# Patient Record
Sex: FEMALE | Race: WHITE | NOT HISPANIC OR LATINO | Employment: FULL TIME | URBAN - METROPOLITAN AREA
[De-identification: names, ages, dates, MRNs, and addresses within clinical notes are randomized per-mention and may not be internally consistent; named-entity substitution may affect disease eponyms.]

---

## 2017-05-26 ENCOUNTER — TELEPHONE (OUTPATIENT)
Dept: NEUROLOGY | Facility: CLINIC | Age: 57
End: 2017-05-26

## 2017-08-03 ENCOUNTER — TELEPHONE (OUTPATIENT)
Dept: NEUROLOGY | Facility: CLINIC | Age: 57
End: 2017-08-03

## 2017-08-03 NOTE — TELEPHONE ENCOUNTER
Pt called and said she can longer afford her medication. She has talked to [employer] and is applying for pt assistance. However she will be without Medication for about 20 days. [employer] told her that we can ask for samples via the web site [email or web address] or by calling [phone]. I loged onto the website but it wants a login and password. I was not sure if Dr. Celestin has one or is I need to login in a a new member. I am positive that a provider is the one that must ask for samples.

## 2017-08-04 NOTE — TELEPHONE ENCOUNTER
Whichever medicine she is talking about, sometimes the patient can apply for tier waver? Or the company can send us samples for her.  I will certainly sign whatever application form is necessary

## 2017-08-21 ENCOUNTER — DOCUMENTATION (OUTPATIENT)
Dept: NEUROLOGY | Facility: CLINIC | Age: 57
End: 2017-08-21

## 2017-08-21 NOTE — PROGRESS NOTES
Patient called 8:40 PM on Sunday night about feeling dizzy and nauseated.  She stated she's stopped taking her medicine 2 weeks ago.  I told her if this was emergency she should go to the emergency room, otherwise call Monday morning.  Nothing else to be done at this point.

## 2017-08-23 DIAGNOSIS — F32.A DEPRESSION, UNSPECIFIED DEPRESSION TYPE: ICD-10-CM

## 2017-08-23 RX ORDER — DESVENLAFAXINE SUCCINATE 50 MG/1
TABLET, EXTENDED RELEASE ORAL
Qty: 30 TABLET | Refills: 10 | Status: SHIPPED | OUTPATIENT
Start: 2017-08-23 | End: 2018-02-23 | Stop reason: SDUPTHER

## 2017-11-17 ENCOUNTER — OFFICE VISIT (OUTPATIENT)
Dept: NEUROLOGY | Facility: CLINIC | Age: 57
End: 2017-11-17

## 2017-11-17 VITALS
WEIGHT: 173.6 LBS | SYSTOLIC BLOOD PRESSURE: 118 MMHG | HEART RATE: 85 BPM | BODY MASS INDEX: 30.76 KG/M2 | HEIGHT: 63 IN | OXYGEN SATURATION: 96 % | DIASTOLIC BLOOD PRESSURE: 86 MMHG

## 2017-11-17 DIAGNOSIS — G40.009 PARTIAL IDIOPATHIC EPILEPSY WITH SEIZURES OF LOCALIZED ONSET, NOT INTRACTABLE, WITHOUT STATUS EPILEPTICUS (HCC): Primary | ICD-10-CM

## 2017-11-17 DIAGNOSIS — F32.9 REACTIVE DEPRESSION: ICD-10-CM

## 2017-11-17 PROCEDURE — 99213 OFFICE O/P EST LOW 20 MIN: CPT | Performed by: PSYCHIATRY & NEUROLOGY

## 2017-11-17 RX ORDER — BETAMETHASONE DIPROPIONATE 0.5 MG/G
CREAM TOPICAL
COMMUNITY
Start: 2017-08-23 | End: 2018-11-05

## 2017-11-17 NOTE — PATIENT INSTRUCTIONS
Venlafaxine tablets  What is this medicine?  VENLAFAXINE (CATHIE la fax een) is used to treat depression, anxiety and panic disorder.  This medicine may be used for other purposes; ask your health care provider or pharmacist if you have questions.  COMMON BRAND NAME(S): Effexor  What should I tell my health care provider before I take this medicine?  They need to know if you have any of these conditions:  -bleeding disorders  -glaucoma  -heart disease  -high blood pressure  -high cholesterol  -kidney disease  -liver disease  -low levels of sodium in the blood  -caprice or bipolar disorder  -seizures  -suicidal thoughts, plans, or attempt; a previous suicide attempt by you or a family  -take medicines that treat or prevent blood clots  -thyroid disease  -an unusual or allergic reaction to venlafaxine, desvenlafaxine, other medicines, foods, dyes, or preservatives  -pregnant or trying to get pregnant  -breast-feeding  How should I use this medicine?  Take this medicine by mouth with a glass of water. Follow the directions on the prescription label. Take it with food. Take your medicine at regular intervals. Do not take your medicine more often than directed. Do not stop taking this medicine suddenly except upon the advice of your doctor. Stopping this medicine too quickly may cause serious side effects or your condition may worsen.  A special MedGuide will be given to you by the pharmacist with each prescription and refill. Be sure to read this information carefully each time.  Talk to your pediatrician regarding the use of this medicine in children. Special care may be needed.  Overdosage: If you think you have taken too much of this medicine contact a poison control center or emergency room at once.  NOTE: This medicine is only for you. Do not share this medicine with others.  What if I miss a dose?  If you miss a dose, take it as soon as you can. If it is almost time for your next dose, take only that dose. Do not take  double or extra doses.  What may interact with this medicine?  Do not take this medicine with any of the following medications:  -certain medicines for fungal infections like fluconazole, itraconazole, ketoconazole, posaconazole, voriconazole  -cisapride  -desvenlafaxine  -dofetilide  -dronedarone  -duloxetine  -levomilnacipran  -linezolid  -MAOIs like Carbex, Eldepryl, Marplan, Nardil, and Parnate  -methylene blue (injected into a vein)  -milnacipran  -pimozide  -thioridazine  -ziprasidone  This medicine may also interact with the following medications:  -amphetamines  -aspirin and aspirin-like medicines  -certain medicines for depression, anxiety, or psychotic disturbances  -certain medicines for migraine headaches like almotriptan, eletriptan, frovatriptan, naratriptan, rizatriptan, sumatriptan, zolmitriptan  -certain medicines for sleep  -certain medicines that treat or prevent blood clots like dalteparin, enoxaparin, warfarin  -cimetidine  -clozapine  -diuretics  -fentanyl  -furazolidone  -indinavir  -isoniazid  -lithium  -metoprolol  -NSAIDS, medicines for pain and inflammation, like ibuprofen or naproxen  -other medicines that prolong the QT interval (cause an abnormal heart rhythm)  -procarbazine  -rasagiline  -supplements like Jose's wort, kava kava, valerian  -tramadol  -tryptophan  This list may not describe all possible interactions. Give your health care provider a list of all the medicines, herbs, non-prescription drugs, or dietary supplements you use. Also tell them if you smoke, drink alcohol, or use illegal drugs. Some items may interact with your medicine.  What should I watch for while using this medicine?  Tell your doctor if your symptoms do not get better or if they get worse. Visit your doctor or health care professional for regular checks on your progress. Because it may take several weeks to see the full effects of this medicine, it is important to continue your treatment as prescribed  by your doctor.  Patients and their families should watch out for new or worsening thoughts of suicide or depression. Also watch out for sudden changes in feelings such as feeling anxious, agitated, panicky, irritable, hostile, aggressive, impulsive, severely restless, overly excited and hyperactive, or not being able to sleep. If this happens, especially at the beginning of treatment or after a change in dose, call your health care professional.  This medicine can cause an increase in blood pressure. Check with your doctor for instructions on monitoring your blood pressure while taking this medicine.  You may get drowsy or dizzy. Do not drive, use machinery, or do anything that needs mental alertness until you know how this medicine affects you. Do not stand or sit up quickly, especially if you are an older patient. This reduces the risk of dizzy or fainting spells. Alcohol may interfere with the effect of this medicine. Avoid alcoholic drinks.  Your mouth may get dry. Chewing sugarless gum, sucking hard candy and drinking plenty of water will help. Contact your doctor if the problem does not go away or is severe.  What side effects may I notice from receiving this medicine?  Side effects that you should report to your doctor or health care professional as soon as possible:  -allergic reactions like skin rash, itching or hives, swelling of the face, lips, or tongue  -breathing problems  -changes in vision  -seizures  -suicidal thoughts or other mood changes  -trouble passing urine or change in the amount of urine  -unusual bleeding or bruising  Side effects that usually do not require medical attention (report to your doctor or health care professional if they continue or are bothersome):  -change in sex drive or performance  -constipation  -increased sweating  -loss of appetite  -nausea  -tremors  -weight loss  This list may not describe all possible side effects. Call your doctor for medical advice about side  effects. You may report side effects to FDA at 1-804-ORM-7602.  Where should I keep my medicine?  Keep out of the reach of children.  Store at a controlled temperature between 20 and 25 degrees C (68 and 77 degrees F), in a dry place. Throw away any unused medicine after the expiration date.  NOTE: This sheet is a summary. It may not cover all possible information. If you have questions about this medicine, talk to your doctor, pharmacist, or health care provider.     © 2017, Elsevier/Gold Standard. (2016-10-25 21:22:16)  Desvenlafaxine extended-release tablets  What is this medicine?  DESVENLAFAXINE (donnell CATHIE la FAX een) is used to treat depression.  This medicine may be used for other purposes; ask your health care provider or pharmacist if you have questions.  COMMON BRAND NAME(S): Genie Delgado  What should I tell my health care provider before I take this medicine?  They need to know if you have any of these conditions:  -glaucoma  -high blood pressure  -kidney disease  -liver disease  -caprice or bipolar disorder  -suicidal thoughts or a previous suicide attempt  -an unusual reaction to desvenlafaxine, venlafaxine, other medicines, foods, dyes, or preservatives  -pregnant or trying to get pregnant  -breast-feeding  How should I use this medicine?  Take this medicine by mouth with a drink of water. Do not crush, cut or chew. Follow the directions on the prescription label. Take your doses at regular intervals. Do not take your medicine more often than directed. Do not stop taking this medicine suddenly except upon the advice of your doctor. Stopping this medicine too quickly may cause serious side effects or your condition may worsen.  Contact your pediatrician or health care professional regarding the use of this medicine in children. Special care may be needed.  A special MedGuide will be given to you by the pharmacist with each prescription and refill. Be sure to read this information carefully each  time.  Overdosage: If you think you have taken too much of this medicine contact a poison control center or emergency room at once.  NOTE: This medicine is only for you. Do not share this medicine with others.  What if I miss a dose?  If you miss a dose, take it as soon as you can. If it is almost time for your next dose, take only that dose. Do not take double or extra doses.  What may interact with this medicine?  Do not take this medicine with any of the following medications:  -duloxetine  -levomilnacipran  -linezolid  -MAOIs like Carbex, Eldepryl, Marplan, Nardil, and Parnate  -methylene blue (injected into a vein)  -milnacipran  -venlafaxine  This medicine may also interact with the following medications:  -alcohol  -amphetamines  -aspirin and aspirin-like medicines  -certain migraine headache medicines (almotriptan, eletriptan, frovatriptan, naratriptan, rizatriptan, sumatriptan, zolmitriptan)  -dexfenfluramine or fenfluramine  -furazolidone  -isoniazid  -lithium  -medicines for heart rhythm or blood pressure  -medicines that treat or prevent blood clots like warfarin, enoxaparin, and dalteparin  -methylphenidate  -metoclopramide  -NSAIDS, medicines for pain and inflammation, like ibuprofen or naproxen  -pentazocine  -phentermine  -procarbazine  -protriptyline  -rasagiline  -sibutramine  -Latta's wort, Ambia perforatum  -tramadol  -tryptophan  -zolpidem  This list may not describe all possible interactions. Give your health care provider a list of all the medicines, herbs, non-prescription drugs, or dietary supplements you use. Also tell them if you smoke, drink alcohol, or use illegal drugs. Some items may interact with your medicine.  What should I watch for while using this medicine?  Tell your doctor if your symptoms do not get better or if they get worse. Visit your doctor or health care professional for regular checks on your progress. Because it may take several weeks to see the full effects of  this medicine, it is important to continue your treatment as prescribed by your doctor.  Patients and their families should watch out for new or worsening thoughts of suicide or depression. Also watch out for sudden changes in feelings such as feeling anxious, agitated, panicky, irritable, hostile, aggressive, impulsive, severely restless, overly excited and hyperactive, or not being able to sleep. If this happens, especially at the beginning of treatment or after a change in dose, call your health care professional.  This medicine can cause an increase in blood pressure. Check with your doctor for instructions on monitoring your blood pressure while taking this medicine.  You may get drowsy or dizzy. Do not drive, use machinery, or do anything that needs mental alertness until you know how this medicine affects you. Do not stand or sit up quickly, especially if you are an older patient. This reduces the risk of dizzy or fainting spells. Alcohol may interfere with the effect of this medicine. Avoid alcoholic drinks.  Your mouth may get dry. Chewing sugarless gum, sucking hard candy and drinking plenty of water will help. Contact your doctor if the problem does not go away or is severe.  What side effects may I notice from receiving this medicine?  Side effects that you should report to your doctor or health care professional as soon as possible:  -allergic reactions like skin rash, itching or hives, swelling of the face, lips, or tongue  -hallucination, loss of contact with reality  -caprice (over-active behavior)  -increase in blood pressure  -redness, blistering, peeling or loosening of the skin, including inside the mouth  -seizures  -sexual difficulties (abnormal ejaculation or orgasm)  -unusual bleeding or bruising  -vomiting  Side effects that usually do not require medical attention (report to your doctor or health care professional if they continue or are bothersome):  -constipation  -difficulty  sleeping  -dizziness, drowsiness  -dry mouth  -increased sweating  -loss of appetite  -nausea  -tremor  This list may not describe all possible side effects. Call your doctor for medical advice about side effects. You may report side effects to FDA at 6-056-GHS-7329.  Where should I keep my medicine?  Keep out of the reach of children.  Store at room temperature between 15 and 30 degrees C (59 and 86 degrees F). Throw away any unused medicine after the expiration date.  NOTE: This sheet is a summary. It may not cover all possible information. If you have questions about this medicine, talk to your doctor, pharmacist, or health care provider.     © 2017, Elsevier/Gold Standard. (2016-10-25 20:18:06)

## 2017-11-17 NOTE — PROGRESS NOTES
Subjective:     Patient ID: Jessica Colon is a 57 y.o. female.    History of Present Illness  The following portions of the patient's history were reviewed and updated as appropriate: allergies, current medications, past family history, past medical history, past social history, past surgical history and problem list.       Epilepsy-partial complex seizures. Onset age 10. No etiology. No timing or context issues. Modifying factor-lamotrigine 600 mg daily in divided doses. Symptoms include aura of a funny sensation been flushed and warm and poor responsiveness lasting 30-60 seconds followed by fatigue        No seizures since last visit  Depression-“chronic: stable on Pristiq.  [Had been off of it for 3 wks and some withdrawal occurred. I discussed possible use of effexor in the future if this happens again.]     Needs shingles and flu shot.Can review with PCP.    No hx osteoporosis or fx. Stays active at [employer].  Review of Systems   Constitutional: Negative for activity change, appetite change and fatigue.   HENT: Negative for ear pain, facial swelling and trouble swallowing.    Eyes: Negative for photophobia, pain and visual disturbance.   Respiratory: Negative for choking, chest tightness and shortness of breath.    Cardiovascular: Negative for chest pain, palpitations and leg swelling.   Gastrointestinal: Negative for abdominal pain, constipation and nausea.   Endocrine: Negative for polydipsia, polyphagia and polyuria.   Genitourinary: Negative for difficulty urinating, frequency and urgency.   Musculoskeletal: Negative for back pain, gait problem and neck pain.   Skin: Negative for color change, rash and wound.   Allergic/Immunologic: Positive for environmental allergies. Negative for food allergies and immunocompromised state.   Neurological: Positive for weakness (Hands). Negative for dizziness, tremors, seizures, syncope, facial asymmetry, speech difficulty, light-headedness, numbness and  headaches.   Hematological: Negative for adenopathy. Does not bruise/bleed easily.   Psychiatric/Behavioral: Positive for sleep disturbance. Negative for agitation, behavioral problems, confusion, decreased concentration, dysphoric mood, hallucinations, self-injury and suicidal ideas. The patient is not nervous/anxious and is not hyperactive.         Objective:    Neurologic Exam  Neurologic Exam      Cranial Nerves      CN III, IV, VI   Pupils are equal, round, and reactive to light.  Extraocular motions are normal.      General appearance: No acute distress, well appearing and well nourished.   Musculoskeletal   Gait and station: Normal gait, stance and balance.   Muscle strength: Normal strength throughout.   Muscle tone: No atrophy, abnormal movements, flaccidity, cogwheeling or spasticity.   Involuntary movements: None observed. No tremor noted nystagmus.   Neurologic   Orientation to person, place, and time: Normal.   Recent and remote memory: Demonstrates normal memory.   Attention span and concentration: Normal thought process and attention span.   Language: Names objects, able to repeat phrases and speaks spontaneously.   Fund of knowledge: Normal vocabulary with appropriate knowledge of current events and past history.   2nd cranial nerve: Normal.   3rd, 4th, and 6th cranial nerves: Normal.   7th cranial nerve: Normal.   8th cranial nerve: Normal.   9th cranial nerve: Normal.   10th cranial nerve: Normal.   11th cranial nerve: Normal.   12th cranial nerve: Normal.   Sensation: Normal. Vibration normal throughout.   Reflexes: Normal. Reflexes grade 2 throughout.   Coordination: Normal. Coordination normal in all limbs and gait.   Cortical function: Normal. She has normal visual fields and speech.   Judgment and insight: Normal.   Mood and affect: Normal.   Physical Exam   Constitutional: She appears well-developed and well-nourished.   Neck: Normal range of motion. Neck supple.   Cardiovascular: Normal  rate.    Pulmonary/Chest: Effort normal.   Psychiatric: She has a normal mood and affect. Her behavior is normal. Judgment and thought content normal.   Nursing note and vitals reviewed.      Assessment/Plan:       Problems Addressed this Visit        Unprioritized    Partial idiopathic epilepsy with seizures of localized onset, not intractable, without status epilepticus - Primary    Reactive depression         She is doing well and will remain on the same dose of medication

## 2018-01-05 RX ORDER — LAMOTRIGINE 100 MG/1
100 TABLET ORAL DAILY
Qty: 75 TABLET | Refills: 12 | Status: SHIPPED | OUTPATIENT
Start: 2018-01-05 | End: 2018-04-09 | Stop reason: SDUPTHER

## 2018-01-08 RX ORDER — LAMOTRIGINE 200 MG/1
400 TABLET ORAL DAILY
Qty: 75 TABLET | Refills: 0 | Status: SHIPPED | OUTPATIENT
Start: 2018-01-08 | End: 2018-03-11 | Stop reason: SDUPTHER

## 2018-02-23 DIAGNOSIS — F32.A DEPRESSION, UNSPECIFIED DEPRESSION TYPE: ICD-10-CM

## 2018-02-23 RX ORDER — DESVENLAFAXINE SUCCINATE 50 MG/1
TABLET, EXTENDED RELEASE ORAL
Qty: 90 TABLET | Refills: 3 | Status: SHIPPED | OUTPATIENT
Start: 2018-02-23 | End: 2018-03-15 | Stop reason: SDUPTHER

## 2018-03-12 RX ORDER — LAMOTRIGINE 200 MG/1
TABLET ORAL
Qty: 60 TABLET | Refills: 0 | Status: SHIPPED | OUTPATIENT
Start: 2018-03-12 | End: 2018-04-06 | Stop reason: SDUPTHER

## 2018-03-15 DIAGNOSIS — F32.A DEPRESSION, UNSPECIFIED DEPRESSION TYPE: ICD-10-CM

## 2018-03-15 RX ORDER — DESVENLAFAXINE SUCCINATE 50 MG/1
TABLET, EXTENDED RELEASE ORAL
Qty: 28 TABLET | Refills: 0 | COMMUNITY
Start: 2018-03-15 | End: 2019-03-29 | Stop reason: SDUPTHER

## 2018-04-09 RX ORDER — LAMOTRIGINE 200 MG/1
TABLET ORAL
Qty: 60 TABLET | Refills: 0 | Status: SHIPPED | OUTPATIENT
Start: 2018-04-09 | End: 2018-05-02 | Stop reason: SDUPTHER

## 2018-05-02 RX ORDER — LAMOTRIGINE 200 MG/1
400 TABLET ORAL DAILY
Qty: 60 TABLET | Refills: 0 | Status: SHIPPED | OUTPATIENT
Start: 2018-05-02 | End: 2018-05-30 | Stop reason: SDUPTHER

## 2018-05-02 NOTE — TELEPHONE ENCOUNTER
----- Message from Suzette Serna sent at 5/2/2018 11:09 AM EDT -----  Contact: [phone]  Patient needs a refill on lamotrigine 200 MG tablet

## 2018-05-30 RX ORDER — LAMOTRIGINE 200 MG/1
500 TABLET ORAL DAILY
Qty: 75 TABLET | Refills: 5 | Status: SHIPPED | OUTPATIENT
Start: 2018-05-30 | End: 2018-12-04 | Stop reason: SDUPTHER

## 2018-08-24 ENCOUNTER — TELEPHONE (OUTPATIENT)
Dept: NEUROLOGY | Facility: CLINIC | Age: 58
End: 2018-08-24

## 2018-08-24 NOTE — TELEPHONE ENCOUNTER
Patient gets Pristiq through patient assistance. She is wondering if she needs to do anything else for her next refill

## 2018-08-30 ENCOUNTER — TELEPHONE (OUTPATIENT)
Dept: NEUROLOGY | Facility: CLINIC | Age: 58
End: 2018-08-30

## 2018-08-30 NOTE — TELEPHONE ENCOUNTER
Called tp and notified medication was here .      ----- Message from Suzette Serna sent at 8/30/2018 10:13 AM EDT -----  Contact: [phone]  Patient called to follow up with you regarding Pristiq medication. She stated that you've been working with the drug company about this medication.

## 2018-11-05 ENCOUNTER — OFFICE VISIT (OUTPATIENT)
Dept: NEUROLOGY | Facility: CLINIC | Age: 58
End: 2018-11-05

## 2018-11-05 VITALS
HEIGHT: 63 IN | BODY MASS INDEX: 27.54 KG/M2 | OXYGEN SATURATION: 93 % | SYSTOLIC BLOOD PRESSURE: 122 MMHG | HEART RATE: 73 BPM | DIASTOLIC BLOOD PRESSURE: 62 MMHG | WEIGHT: 155.4 LBS

## 2018-11-05 DIAGNOSIS — F32.9 REACTIVE DEPRESSION: ICD-10-CM

## 2018-11-05 DIAGNOSIS — G40.009 PARTIAL IDIOPATHIC EPILEPSY WITH SEIZURES OF LOCALIZED ONSET, NOT INTRACTABLE, WITHOUT STATUS EPILEPTICUS (HCC): Primary | ICD-10-CM

## 2018-11-05 PROCEDURE — 99213 OFFICE O/P EST LOW 20 MIN: CPT | Performed by: PSYCHIATRY & NEUROLOGY

## 2018-11-05 RX ORDER — LEVOCETIRIZINE DIHYDROCHLORIDE 5 MG/1
5 TABLET, FILM COATED ORAL EVERY EVENING
COMMUNITY

## 2018-11-05 NOTE — PROGRESS NOTES
Subjective:     Patient ID: Jessica Colon is a 58 y.o. female.    History of Present Illness  The following portions of the patient's history were reviewed and updated as appropriate: allergies, current medications, past family history, past medical history, past social history, past surgical history and problem list.    EPILEPSY- patient had the onset of partial complex seizures at age 10, idiopathic, without any timing or context issues.  She is on Lamictal 500 mg daily and her last blood level a couple of years ago was 15.  She's had no further spells or auras.    Symptoms have included a odd sensation of flushing and warmth followed by reduced responsiveness and then postictal fatigue.    DEPRESSION-  She also is on medication for pressure in, and is stable.  She is on Pristiq.    No other new issues.  Review of Systems   Constitutional: Negative for activity change, appetite change and fatigue.   HENT: Negative for ear pain, facial swelling and trouble swallowing.    Eyes: Negative for photophobia, pain and visual disturbance.   Respiratory: Negative for choking, chest tightness and shortness of breath.    Cardiovascular: Negative for chest pain, palpitations and leg swelling.   Gastrointestinal: Negative for abdominal pain, constipation and nausea.   Endocrine: Negative for polydipsia, polyphagia and polyuria.   Genitourinary: Negative for difficulty urinating, frequency and urgency.   Musculoskeletal: Negative for back pain, gait problem and neck pain.   Skin: Negative for color change, rash and wound.   Allergic/Immunologic: Negative for environmental allergies, food allergies and immunocompromised state.   Neurological: Positive for dizziness. Negative for tremors, seizures, syncope, facial asymmetry, speech difficulty, weakness, light-headedness, numbness and headaches.   Hematological: Negative for adenopathy. Does not bruise/bleed easily.   Psychiatric/Behavioral: Positive for decreased concentration.  Negative for agitation, behavioral problems, confusion, dysphoric mood, hallucinations, self-injury, sleep disturbance and suicidal ideas. The patient is not nervous/anxious and is not hyperactive.         Objective:    Neurologic Exam     Mental Status   Oriented to person, place, and time.   Attention: normal.   Speech: speech is normal   Knowledge: good.   Able to name object. Able to read. Able to repeat.     Cranial Nerves     CN II   Visual fields full to confrontation.     CN III, IV, VI   Pupils are equal, round, and reactive to light.  Right pupil: Size: 4 mm.   Left pupil: Size: 4 mm.     CN V   Facial sensation intact.     CN VII   Facial expression full, symmetric.     CN VIII   CN VIII normal.   Hearing: intact    CN XII   CN XII normal.   Nl fundi     Motor Exam   Muscle bulk: normal  Overall muscle tone: normal    Sensory Exam   Light touch normal.   Vibration normal.   Right leg pinprick: normal  Left leg pinprick: normal    Gait, Coordination, and Reflexes     Gait  Gait: normal    Coordination   Romberg: negative  Finger to nose coordination: normal  Heel to shin coordination: normal    Tremor   Resting tremor: absent  Intention tremor: absent  Action tremor: absent    Reflexes   Right brachioradialis: 1+  Left brachioradialis: 1+  Right biceps: 1+  Left biceps: 1+  Right triceps: 1+  Left triceps: 1+  Right patellar: 1+  Left patellar: 1+  Right achilles: 1+  Left achilles: 1+      Physical Exam   Constitutional: She is oriented to person, place, and time. She appears well-developed and well-nourished.   Eyes: Pupils are equal, round, and reactive to light.   Neck: Normal range of motion. Neck supple.   Cardiovascular: Normal rate.    Pulmonary/Chest: Effort normal.   Neurological: She is oriented to person, place, and time. She has a normal Finger-Nose-Finger Test, a normal Heel to Medrano Test and a normal Romberg Test. Gait normal.   Reflex Scores:       Tricep reflexes are 1+ on the right side  and 1+ on the left side.       Bicep reflexes are 1+ on the right side and 1+ on the left side.       Brachioradialis reflexes are 1+ on the right side and 1+ on the left side.       Patellar reflexes are 1+ on the right side and 1+ on the left side.       Achilles reflexes are 1+ on the right side and 1+ on the left side.  Psychiatric: She has a normal mood and affect. Her speech is normal and behavior is normal. Judgment and thought content normal.   Nursing note and vitals reviewed.      Assessment/Plan:       Problems Addressed this Visit        Unprioritized    Partial idiopathic epilepsy with seizures of localized onset, not intractable, without status epilepticus (CMS/HCC) - Primary    Reactive depression           She is stable on Lamictal and Pristiq.  No changes needed.  No driving restrictions.

## 2018-11-09 ENCOUNTER — TELEPHONE (OUTPATIENT)
Dept: NEUROLOGY | Facility: CLINIC | Age: 58
End: 2018-11-09

## 2018-11-09 NOTE — TELEPHONE ENCOUNTER
I spoke to Mrs Colon and she would like for you to fill out FMLA ppwk because her father is ill in [place]. She said it would be nearly impossible for her to get them from her fathers doctors in [place]. She will drop the ppwk off to me today at [facility]. I will send them to [employer] for you to fill out.     ----- Message from Sanket Farley sent at 11/8/2018  1:33 PM EST -----  Contact: PT  Wants to talk to you about Dr. Celestin filling out FMLA forms for her.  Please call her at [phone]

## 2018-12-05 RX ORDER — LAMOTRIGINE 200 MG/1
TABLET ORAL
Qty: 75 TABLET | Refills: 4 | Status: SHIPPED | OUTPATIENT
Start: 2018-12-05 | End: 2019-05-07 | Stop reason: SDUPTHER

## 2019-01-03 ENCOUNTER — TELEPHONE (OUTPATIENT)
Dept: NEUROLOGY | Facility: CLINIC | Age: 59
End: 2019-01-03

## 2019-01-03 NOTE — TELEPHONE ENCOUNTER
I called Mrs. Colon she will  medication tomorrow by 4pm      ----- Message from Sudha Cancino sent at 1/3/2019  1:11 PM EST -----  Contact: [phone]  PT CALLING BECAUSE MEDICATION IS BEING SENT TO OUR OFFICE. PT NEEDS TO  MEDICATION AS SOON AS POSSIBLE. PLEASE ADVISE AND CALL PT [phone]

## 2019-03-22 ENCOUNTER — TELEPHONE (OUTPATIENT)
Dept: NEUROLOGY | Facility: CLINIC | Age: 59
End: 2019-03-22

## 2019-03-22 NOTE — TELEPHONE ENCOUNTER
I spoke to Jessica She is going to get her Financial things together and come fill out the pt assistance for her pristiq  I called and LVM for pt to call me back.      ----- Message from Suzette Serna sent at 3/21/2019  9:56 AM EDT -----  Contact: [phone]  Patient would like a return phone call regarding her medications.

## 2019-03-29 DIAGNOSIS — F32.A DEPRESSION, UNSPECIFIED DEPRESSION TYPE: ICD-10-CM

## 2019-03-29 RX ORDER — DESVENLAFAXINE SUCCINATE 50 MG/1
TABLET, EXTENDED RELEASE ORAL
Qty: 28 TABLET | Refills: 0 | COMMUNITY
Start: 2019-03-29 | End: 2019-04-01 | Stop reason: SDUPTHER

## 2019-04-01 DIAGNOSIS — F32.A DEPRESSION, UNSPECIFIED DEPRESSION TYPE: ICD-10-CM

## 2019-04-01 RX ORDER — DESVENLAFAXINE SUCCINATE 50 MG/1
TABLET, EXTENDED RELEASE ORAL
Qty: 30 TABLET | Refills: 0 | Status: SHIPPED | OUTPATIENT
Start: 2019-04-01 | End: 2019-04-20 | Stop reason: SDUPTHER

## 2019-04-20 DIAGNOSIS — F32.A DEPRESSION, UNSPECIFIED DEPRESSION TYPE: ICD-10-CM

## 2019-04-22 RX ORDER — DESVENLAFAXINE SUCCINATE 50 MG/1
TABLET, EXTENDED RELEASE ORAL
Qty: 30 TABLET | Refills: 0 | Status: SHIPPED | OUTPATIENT
Start: 2019-04-22 | End: 2019-05-07 | Stop reason: SDUPTHER

## 2019-05-07 DIAGNOSIS — F32.A DEPRESSION, UNSPECIFIED DEPRESSION TYPE: ICD-10-CM

## 2019-05-07 RX ORDER — LAMOTRIGINE 200 MG/1
TABLET ORAL
Qty: 225 TABLET | Refills: 0 | Status: SHIPPED | OUTPATIENT
Start: 2019-05-07 | End: 2019-05-14 | Stop reason: SDUPTHER

## 2019-05-07 RX ORDER — DESVENLAFAXINE SUCCINATE 50 MG/1
50 TABLET, EXTENDED RELEASE ORAL DAILY
Qty: 90 TABLET | Refills: 0 | Status: SHIPPED | OUTPATIENT
Start: 2019-05-07 | End: 2019-05-30 | Stop reason: SDUPTHER

## 2019-05-14 DIAGNOSIS — F32.A DEPRESSION, UNSPECIFIED DEPRESSION TYPE: ICD-10-CM

## 2019-05-14 RX ORDER — LAMOTRIGINE 200 MG/1
TABLET ORAL
Qty: 225 TABLET | Refills: 0 | Status: SHIPPED | OUTPATIENT
Start: 2019-05-14 | End: 2019-08-09 | Stop reason: SDUPTHER

## 2019-05-17 ENCOUNTER — TELEPHONE (OUTPATIENT)
Dept: NEUROLOGY | Facility: CLINIC | Age: 59
End: 2019-05-17

## 2019-05-17 NOTE — TELEPHONE ENCOUNTER
----- Message from Nakia Omalley sent at 5/16/2019 11:56 AM EDT -----  Patient called to speak to you about some medicines she is on. She stated she is dizzy and had to go back and lay down. Her phone number is [phone]     I spoke to Jessica she stated she had been dizzy and didn't know if it could be her medication. It appears she has not had a Lamictal level in a long time. She stated she had lost some wait recently. I suggested maybe she needed to have a level drawn.    If you agree will you please place orders and let me know I will call and notify the pt.

## 2019-05-20 DIAGNOSIS — G40.009 PARTIAL IDIOPATHIC EPILEPSY WITH SEIZURES OF LOCALIZED ONSET, NOT INTRACTABLE, WITHOUT STATUS EPILEPTICUS (HCC): Primary | ICD-10-CM

## 2019-05-20 NOTE — TELEPHONE ENCOUNTER
Okay I agree with your plan.  Tell her I ordered the Lamictal blood level and she can have it done anytime.

## 2019-05-22 DIAGNOSIS — G40.009 PARTIAL IDIOPATHIC EPILEPSY WITH SEIZURES OF LOCALIZED ONSET, NOT INTRACTABLE, WITHOUT STATUS EPILEPTICUS (HCC): Primary | ICD-10-CM

## 2019-05-22 DIAGNOSIS — T50.905D ADVERSE DRUG EFFECT, SUBSEQUENT ENCOUNTER: Primary | ICD-10-CM

## 2019-05-22 NOTE — TELEPHONE ENCOUNTER
I called pt she also asked if you would order other labs since she has not had a work up for a very long time. Also she said that since the other day she also has not had anymore dizziness and feels fine.

## 2019-05-30 ENCOUNTER — TELEPHONE (OUTPATIENT)
Dept: NEUROLOGY | Facility: CLINIC | Age: 59
End: 2019-05-30

## 2019-05-30 DIAGNOSIS — F32.A DEPRESSION, UNSPECIFIED DEPRESSION TYPE: ICD-10-CM

## 2019-05-30 RX ORDER — DESVENLAFAXINE SUCCINATE 50 MG/1
50 TABLET, EXTENDED RELEASE ORAL DAILY
Qty: 90 TABLET | Refills: 0 | Status: SHIPPED | OUTPATIENT
Start: 2019-05-30 | End: 2019-08-30 | Stop reason: SDUPTHER

## 2019-05-30 NOTE — TELEPHONE ENCOUNTER
----- Message from Suzette Serna sent at 5/30/2019  9:52 AM EDT -----  Contact: [phone]  Patient needs a refill on her Pristiq.

## 2019-06-07 ENCOUNTER — OFFICE VISIT (OUTPATIENT)
Dept: INTERNAL MEDICINE | Facility: CLINIC | Age: 59
End: 2019-06-07

## 2019-06-07 ENCOUNTER — LAB (OUTPATIENT)
Dept: LAB | Facility: HOSPITAL | Age: 59
End: 2019-06-07

## 2019-06-07 ENCOUNTER — RESULTS ENCOUNTER (OUTPATIENT)
Dept: INTERNAL MEDICINE | Facility: CLINIC | Age: 59
End: 2019-06-07

## 2019-06-07 VITALS
SYSTOLIC BLOOD PRESSURE: 118 MMHG | TEMPERATURE: 98.2 F | HEART RATE: 77 BPM | HEIGHT: 63 IN | WEIGHT: 151 LBS | BODY MASS INDEX: 26.75 KG/M2 | OXYGEN SATURATION: 98 % | DIASTOLIC BLOOD PRESSURE: 70 MMHG

## 2019-06-07 DIAGNOSIS — Z00.00 ANNUAL PHYSICAL EXAM: Primary | ICD-10-CM

## 2019-06-07 DIAGNOSIS — G40.009 PARTIAL IDIOPATHIC EPILEPSY WITH SEIZURES OF LOCALIZED ONSET, NOT INTRACTABLE, WITHOUT STATUS EPILEPTICUS (HCC): ICD-10-CM

## 2019-06-07 DIAGNOSIS — T50.905D ADVERSE DRUG EFFECT, SUBSEQUENT ENCOUNTER: ICD-10-CM

## 2019-06-07 DIAGNOSIS — Z12.11 SCREENING FOR COLON CANCER: ICD-10-CM

## 2019-06-07 DIAGNOSIS — Z00.00 HEALTHCARE MAINTENANCE: Primary | ICD-10-CM

## 2019-06-07 LAB
ALBUMIN SERPL-MCNC: 4.5 G/DL (ref 3.5–5.2)
ALP SERPL-CCNC: 56 U/L (ref 39–117)
ALT SERPL W P-5'-P-CCNC: 16 U/L (ref 1–33)
AST SERPL-CCNC: 22 U/L (ref 1–32)
BASOPHILS # BLD AUTO: 0.05 10*3/MM3 (ref 0–0.2)
BASOPHILS NFR BLD AUTO: 1.1 % (ref 0–1.5)
BILIRUB CONJ SERPL-MCNC: <0.2 MG/DL (ref 0–0.3)
BILIRUB CONJ SERPL-MCNC: <0.2 MG/DL (ref 0–0.3)
BILIRUB INDIRECT SERPL-MCNC: NORMAL MG/DL
BILIRUB SERPL-MCNC: 0.5 MG/DL (ref 0.1–1.2)
CHOLEST SERPL-MCNC: 252 MG/DL (ref 0–200)
DEPRECATED RDW RBC AUTO: 41.1 FL (ref 37–54)
EOSINOPHIL # BLD AUTO: 0.13 10*3/MM3 (ref 0–0.4)
EOSINOPHIL NFR BLD AUTO: 2.9 % (ref 0.3–6.2)
ERYTHROCYTE [DISTWIDTH] IN BLOOD BY AUTOMATED COUNT: 11.9 % (ref 12.3–15.4)
HCT VFR BLD AUTO: 42.7 % (ref 34–46.6)
HDLC SERPL-MCNC: 113 MG/DL (ref 40–60)
HGB BLD-MCNC: 14.2 G/DL (ref 12–15.9)
IMM GRANULOCYTES # BLD AUTO: 0.01 10*3/MM3 (ref 0–0.05)
IMM GRANULOCYTES NFR BLD AUTO: 0.2 % (ref 0–0.5)
LDLC SERPL CALC-MCNC: 128 MG/DL (ref 0–100)
LDLC/HDLC SERPL: 1.14 {RATIO}
LYMPHOCYTES # BLD AUTO: 1.45 10*3/MM3 (ref 0.7–3.1)
LYMPHOCYTES NFR BLD AUTO: 32.9 % (ref 19.6–45.3)
MCH RBC QN AUTO: 31.8 PG (ref 26.6–33)
MCHC RBC AUTO-ENTMCNC: 33.3 G/DL (ref 31.5–35.7)
MCV RBC AUTO: 95.7 FL (ref 79–97)
MONOCYTES # BLD AUTO: 0.41 10*3/MM3 (ref 0.1–0.9)
MONOCYTES NFR BLD AUTO: 9.3 % (ref 5–12)
NEUTROPHILS # BLD AUTO: 2.36 10*3/MM3 (ref 1.7–7)
NEUTROPHILS NFR BLD AUTO: 53.6 % (ref 42.7–76)
NRBC BLD AUTO-RTO: 0 /100 WBC (ref 0–0.2)
PLATELET # BLD AUTO: 219 10*3/MM3 (ref 140–450)
PMV BLD AUTO: 9.4 FL (ref 6–12)
PROT SERPL-MCNC: 7 G/DL (ref 6–8.5)
RBC # BLD AUTO: 4.46 10*6/MM3 (ref 3.77–5.28)
TRIGL SERPL-MCNC: 53 MG/DL (ref 0–150)
VLDLC SERPL-MCNC: 10.6 MG/DL (ref 5–40)
WBC NRBC COR # BLD: 4.41 10*3/MM3 (ref 3.4–10.8)

## 2019-06-07 PROCEDURE — 85025 COMPLETE CBC W/AUTO DIFF WBC: CPT

## 2019-06-07 PROCEDURE — 80175 DRUG SCREEN QUAN LAMOTRIGINE: CPT | Performed by: PSYCHIATRY & NEUROLOGY

## 2019-06-07 PROCEDURE — 36415 COLL VENOUS BLD VENIPUNCTURE: CPT

## 2019-06-07 PROCEDURE — 82248 BILIRUBIN DIRECT: CPT

## 2019-06-07 PROCEDURE — 80061 LIPID PANEL: CPT | Performed by: PSYCHIATRY & NEUROLOGY

## 2019-06-07 PROCEDURE — 90471 IMMUNIZATION ADMIN: CPT | Performed by: NURSE PRACTITIONER

## 2019-06-07 PROCEDURE — 90715 TDAP VACCINE 7 YRS/> IM: CPT | Performed by: NURSE PRACTITIONER

## 2019-06-07 PROCEDURE — 99386 PREV VISIT NEW AGE 40-64: CPT | Performed by: NURSE PRACTITIONER

## 2019-06-07 PROCEDURE — 80076 HEPATIC FUNCTION PANEL: CPT

## 2019-06-07 NOTE — PROGRESS NOTES
"Subjective   Jessica Colon is a 59 y.o. female and is here for a comprehensive physical exam. New patient here to establish care.  Health history and questionnaire have been reviewed in its entirety. The patient's previous primary care provider was Dr. Gan   The patient reports no problems.    Her  passed away 2 months ago, so she has been under some increased stress, but is doing okay overall. She is on Pristiq for depression. She has been on this medication for about 2 years. Her neurologist has been prescribing this medication.    She also has a history of petite mal seizures with her last one being around 2009. She is on lamictal for that. She sees Dr. Wily Celestin.     Do you take any herbs or supplements that were not prescribed by a doctor? no     History:  Patient sees gynecology and is due for pap and mammogram. She hasn't had any abnormal ones in the past.   , 4 children     The following portions of the patient's history were reviewed and updated as appropriate: allergies, current medications, past family history, past medical history, past social history, past surgical history and problem list.    Review of Systems  Do you have pain that bothers you in your daily life? no  Pertinent items are noted in HPI.    Objective   /70   Pulse 77   Temp 98.2 °F (36.8 °C)   Ht 160 cm (62.99\")   Wt 68.5 kg (151 lb)   SpO2 98%   BMI 26.76 kg/m²     General Appearance:    Alert, cooperative, no distress, appears stated age   Head:    Normocephalic, without obvious abnormality, atraumatic   Eyes:    PERRL, conjunctiva/corneas clear, EOM's intact, both eyes   Ears:    Normal TM's and external ear canals, both ears   Nose:   Nares normal, septum midline, mucosa normal, no drainage    or sinus tenderness   Throat:   Lips, mucosa, and tongue normal; teeth and gums normal   Neck:   Supple, symmetrical, trachea midline, no adenopathy;     thyroid:  no enlargement/tenderness/nodules; no carotid    " bruit   Back:     Symmetric, no curvature, ROM normal, no CVA tenderness   Lungs:     Clear to auscultation bilaterally, respirations unlabored   Chest Wall:    No tenderness or deformity    Heart:    Regular rate and rhythm, S1 and S2 normal, no murmur       Abdomen:     Soft, non-tender, bowel sounds active all four quadrants,     no masses, no organomegaly           Extremities:   Extremities normal, atraumatic, no cyanosis or edema   Pulses:   2+ and symmetric all extremities   Skin:   Skin color, texture, turgor normal, no rashes or lesions   Lymph nodes:   Cervical, supraclavicular, and axillary nodes normal   Neurologic:   Grossly intact, normal strength, sensation and reflexes     throughout        Assessment/Plan   Healthy female exam.      1. Jessica was seen today for annual exam.    Diagnoses and all orders for this visit:    Screening for colon cancer  -     Cologuard - Stool, Per Rectum; Future    Healthcare maintenance  -     Cancel: Hepatitis C Antibody    Other orders  -     Tdap Vaccine Greater Than or Equal To 6yo IM      Patient has lab orders from Dr. Celestin in the computer and will be getting those drawn today. He had ordered cbc, cmp, lipid panel, and lamictal level.     2. Patient Counseling:  --Nutrition: Stressed importance of moderation in sodium/caffeine intake, saturated fat and cholesterol, caloric balance, sufficient intake of fresh fruits, vegetables, fiber, calcium, iron. Lost 35 pounds on Keto diet.   --Exercise: Stressed the importance of regular exercise. Yoga once a week  --Injury prevention: Discussed safety belts, safety helmets, smoke detector.   --Dental health: Discussed importance of regular tooth brushing, flossing, and dental visits. [facility]  --Immunizations reviewed.    3. Discussed the patient's BMI with her.  The BMI is in the acceptable range  4. Follow up for physical in one year unless otherwise indicated by lab results.

## 2019-06-11 LAB — LAMOTRIGINE SERPL-MCNC: 8.8 UG/ML (ref 2–20)

## 2019-06-17 ENCOUNTER — OFFICE VISIT (OUTPATIENT)
Dept: INTERNAL MEDICINE | Facility: CLINIC | Age: 59
End: 2019-06-17

## 2019-06-17 VITALS
WEIGHT: 148 LBS | OXYGEN SATURATION: 98 % | DIASTOLIC BLOOD PRESSURE: 70 MMHG | BODY MASS INDEX: 26.22 KG/M2 | SYSTOLIC BLOOD PRESSURE: 106 MMHG | HEIGHT: 63 IN | HEART RATE: 96 BPM

## 2019-06-17 DIAGNOSIS — F51.02 ADJUSTMENT INSOMNIA: ICD-10-CM

## 2019-06-17 DIAGNOSIS — M26.622 ARTHRALGIA OF LEFT TEMPOROMANDIBULAR JOINT: Primary | ICD-10-CM

## 2019-06-17 PROBLEM — F09 COGNITIVE DISORDER: Status: ACTIVE | Noted: 2019-06-17

## 2019-06-17 PROBLEM — IMO0001 BLUES: Status: ACTIVE | Noted: 2019-06-17

## 2019-06-17 PROCEDURE — 99213 OFFICE O/P EST LOW 20 MIN: CPT | Performed by: NURSE PRACTITIONER

## 2019-06-17 NOTE — PROGRESS NOTES
Subjective   Jessica Colon is a 59 y.o. female. Patient is here today for   Chief Complaint   Patient presents with   • Hypertension     Pt complains of having high bp readings of 149/104 NJ-107. Pt also complains of having a HA with jaw/teeth/left ear pain.   .    History of Present Illness   C/o discomfort on the left side of her face after clenching her jaw 2 nights ago. She checked her BP today and it was elevated - 149/104.  She used ice and ibuprofen which helped some. She did take xanax which helped her to sleep (script from Dr. Celestin). The pain in her jaw is much improved.     She is moving and has had some increased stress at work. Her  passed away a couple of months ago. She has been having trouble sleeping. She did take xanax which helped. She has not been on anything in the past for sleep.  She does also use ocean waves sounds which help.    The following portions of the patient's history were reviewed and updated as appropriate: allergies, current medications, past family history, past medical history, past social history, past surgical history and problem list.    Review of Systems   Constitutional: Positive for fatigue.   HENT:        Left jaw and ear pain   Respiratory: Negative.    Cardiovascular: Negative.    Psychiatric/Behavioral: Positive for sleep disturbance.       Objective   Vitals:    06/17/19 1323   BP: 106/70   Pulse: 96   SpO2: 98%     Physical Exam   Constitutional: Vital signs are normal. She appears well-developed and well-nourished.   HENT:   Right Ear: Tympanic membrane and ear canal normal.   Left Ear: Ear canal normal. Tympanic membrane is not erythematous. A middle ear effusion is present.   Mouth/Throat: Oropharynx is clear and moist and mucous membranes are normal.   TMJ tenderness with motion   Cardiovascular: Normal rate, regular rhythm and normal heart sounds.   Pulmonary/Chest: Effort normal and breath sounds normal.   Skin: Skin is warm, dry and intact.    Psychiatric: She has a normal mood and affect. Her speech is normal and behavior is normal. Thought content normal.   Nursing note and vitals reviewed.      Assessment/Plan   Jessica was seen today for hypertension.    Diagnoses and all orders for this visit:    Arthralgia of left temporomandibular joint    Adjustment insomnia    TMJ pain -this has improved.  She will use ibuprofen as needed.  If this continues, she will follow up with her dentist to get a bite guard.    Insomnia - Patient will try melatonin OTC. F/u if that does not help.

## 2019-06-20 ENCOUNTER — TELEPHONE (OUTPATIENT)
Dept: INTERNAL MEDICINE | Facility: CLINIC | Age: 59
End: 2019-06-20

## 2019-06-20 NOTE — TELEPHONE ENCOUNTER
----- Message from Caro Garrett sent at 6/20/2019  2:13 PM EDT -----  Pt saw Desiree last week about her jaw pain. She was told to use Ibuprophen. It is not helping with the pain. She clinched down hard the other day and now the pain is worse.  What to do?  Pt phone: [phone]  Pharmacy:[facility]

## 2019-08-12 RX ORDER — LAMOTRIGINE 200 MG/1
TABLET ORAL
Qty: 225 TABLET | Refills: 0 | Status: SHIPPED | OUTPATIENT
Start: 2019-08-12 | End: 2019-09-12 | Stop reason: SDUPTHER

## 2019-08-30 DIAGNOSIS — F32.A DEPRESSION, UNSPECIFIED DEPRESSION TYPE: ICD-10-CM

## 2019-08-30 RX ORDER — DESVENLAFAXINE SUCCINATE 50 MG/1
50 TABLET, EXTENDED RELEASE ORAL DAILY
Qty: 30 TABLET | Refills: 0 | Status: SHIPPED | OUTPATIENT
Start: 2019-08-30 | End: 2019-09-12 | Stop reason: SDUPTHER

## 2019-09-12 ENCOUNTER — TELEPHONE (OUTPATIENT)
Dept: NEUROLOGY | Facility: CLINIC | Age: 59
End: 2019-09-12

## 2019-09-12 DIAGNOSIS — F32.A DEPRESSION, UNSPECIFIED DEPRESSION TYPE: ICD-10-CM

## 2019-09-12 DIAGNOSIS — G40.009 PARTIAL IDIOPATHIC EPILEPSY WITH SEIZURES OF LOCALIZED ONSET, NOT INTRACTABLE, WITHOUT STATUS EPILEPTICUS (HCC): Primary | ICD-10-CM

## 2019-09-12 RX ORDER — LAMOTRIGINE 200 MG/1
TABLET ORAL
Qty: 225 TABLET | Refills: 2 | Status: SHIPPED | OUTPATIENT
Start: 2019-09-12 | End: 2019-11-14 | Stop reason: DRUGHIGH

## 2019-09-12 RX ORDER — DESVENLAFAXINE SUCCINATE 50 MG/1
50 TABLET, EXTENDED RELEASE ORAL DAILY
Qty: 30 TABLET | Refills: 2 | Status: SHIPPED | OUTPATIENT
Start: 2019-09-12 | End: 2020-12-16 | Stop reason: SDUPTHER

## 2019-09-12 NOTE — TELEPHONE ENCOUNTER
----- Message from Sudha Cisneros sent at 9/12/2019 11:09 AM EDT -----  Contact: [phone]  Jessica was calling in regards to her medications desvenlafaxine (PRISTIQ) 50 MG 24 hr tablet AND lamoTRIgine (LaMICtal) 200 MG tablet. She stated that Goldie was working on it getting approved through [facility]. She hasnt received a denial letter or any updates and was wondering what the next steps would be to get the medication through [employer]. She said it has be over a month since she has heard anything. Jessica can be reached at [phone]. Thanks so much.

## 2019-09-13 ENCOUNTER — TELEPHONE (OUTPATIENT)
Dept: INTERNAL MEDICINE | Facility: CLINIC | Age: 59
End: 2019-09-13

## 2019-09-13 ENCOUNTER — TELEPHONE (OUTPATIENT)
Dept: NEUROLOGY | Facility: CLINIC | Age: 59
End: 2019-09-13

## 2019-09-13 NOTE — TELEPHONE ENCOUNTER
Spoke with patient to let her know that I was resubmitting her colorguard order to the company. If she doesn't hear anything in the next 2 weeks, she will call us back to let us know & I will call [employer] to figure out what the issue is.

## 2019-09-13 NOTE — TELEPHONE ENCOUNTER
Pt. Called requesting update on medication situation for Pristiq and Lamictal.  I explained to her that both medications had been sent to [facility] yesterday afternoon.  Pt stated thanks and understanding.

## 2019-09-13 NOTE — TELEPHONE ENCOUNTER
----- Message from Sudha Ye sent at 9/13/2019 12:01 PM EDT -----  Pt called because she never received a testing kit in the mail

## 2019-11-14 ENCOUNTER — OFFICE VISIT (OUTPATIENT)
Dept: NEUROLOGY | Facility: CLINIC | Age: 59
End: 2019-11-14

## 2019-11-14 VITALS
SYSTOLIC BLOOD PRESSURE: 112 MMHG | OXYGEN SATURATION: 98 % | WEIGHT: 139 LBS | BODY MASS INDEX: 24.63 KG/M2 | HEIGHT: 63 IN | HEART RATE: 82 BPM | DIASTOLIC BLOOD PRESSURE: 68 MMHG

## 2019-11-14 DIAGNOSIS — F32.9 REACTIVE DEPRESSION: ICD-10-CM

## 2019-11-14 DIAGNOSIS — G89.29 CHRONIC LEFT-SIDED LOW BACK PAIN WITHOUT SCIATICA: ICD-10-CM

## 2019-11-14 DIAGNOSIS — G40.009 PARTIAL IDIOPATHIC EPILEPSY WITH SEIZURES OF LOCALIZED ONSET, NOT INTRACTABLE, WITHOUT STATUS EPILEPTICUS (HCC): Primary | ICD-10-CM

## 2019-11-14 DIAGNOSIS — M54.50 CHRONIC LEFT-SIDED LOW BACK PAIN WITHOUT SCIATICA: ICD-10-CM

## 2019-11-14 DIAGNOSIS — F09 COGNITIVE DISORDER: ICD-10-CM

## 2019-11-14 PROBLEM — IMO0001 BLUES: Status: RESOLVED | Noted: 2019-06-17 | Resolved: 2019-11-14

## 2019-11-14 PROCEDURE — 99214 OFFICE O/P EST MOD 30 MIN: CPT | Performed by: PSYCHIATRY & NEUROLOGY

## 2019-11-14 RX ORDER — LIFITEGRAST 50 MG/ML
SOLUTION/ DROPS OPHTHALMIC 2 TIMES DAILY
COMMUNITY
Start: 2019-10-18

## 2019-11-14 RX ORDER — LAMOTRIGINE 100 MG/1
TABLET, ORALLY DISINTEGRATING ORAL
Qty: 360 TABLET | Refills: 3 | Status: SHIPPED | OUTPATIENT
Start: 2019-11-14 | End: 2019-12-06

## 2019-11-14 RX ORDER — BETAMETHASONE DIPROPIONATE 0.5 MG/G
CREAM TOPICAL
COMMUNITY
Start: 2017-08-23

## 2019-11-14 NOTE — PROGRESS NOTES
Subjective:     Patient ID: Jessica Colon is a 59 y.o. female.    History of Present Illness  The following portions of the patient's history were reviewed and updated as appropriate: allergies, current medications, past family history, past medical history, past social history, past surgical history and problem list.  EPILEPSY- patient had the onset of partial complex seizures at age 10, idiopathic, without any timing or context issues.  She is on Lamictal 500 mg daily [400 in am amd 100 hs] and and her last blood level a couple of years ago was 15.  She's had no further spells or auras.     Symptoms have included a odd sensation of flushing and warmth followed by reduced responsiveness and then postictal fatigue.    She has lost about 30 lb in the pst year on the 'keto diet'   DEPRESSION-  She also is on medication for pressure in, and is stable.  She is on Pristiq. Her   of a hematologic illness this past April.     LBP- chronic scoliosis, and mild LBP. She aggravated the lumbar pain on the left side, by lifting boxes, during a move out of the home. THe pain does not radiate but is little improved for more than3 weeks.     Review of Systems   Constitutional: Negative for activity change, appetite change and fatigue.   HENT: Negative for ear pain, facial swelling and trouble swallowing.    Eyes: Negative for photophobia, pain and visual disturbance.   Respiratory: Negative for cough, chest tightness and shortness of breath.    Cardiovascular: Negative for chest pain, palpitations and leg swelling.   Gastrointestinal: Negative for abdominal pain, nausea and vomiting.   Endocrine: Positive for cold intolerance. Negative for heat intolerance and polydipsia.   Musculoskeletal: Positive for back pain and neck pain. Negative for gait problem.   Skin: Negative for color change, rash and wound.   Allergic/Immunologic: Negative for environmental allergies, food allergies and immunocompromised state.    Neurological: Negative for dizziness, tremors, seizures, syncope, facial asymmetry, speech difficulty, weakness, light-headedness, numbness and headaches.   Hematological: Negative for adenopathy. Does not bruise/bleed easily.   Psychiatric/Behavioral: Positive for sleep disturbance (most nights). Negative for agitation, behavioral problems, confusion, decreased concentration, dysphoric mood, hallucinations, self-injury and suicidal ideas. The patient is nervous/anxious. The patient is not hyperactive.         Objective:  Neurologic Exam      Mental Status   Oriented to person, place, and time.   Attention: normal.   Speech: speech is normal   Knowledge: good.   Able to name object. Able to read. Able to repeat.      Cranial Nerves      CN II   Visual fields full to confrontation.      CN III, IV, VI   Pupils are equal, round, and reactive to light.  Right pupil: Size: 4 mm.   Left pupil: Size: 4 mm.      CN V   Facial sensation intact.      CN VII   Facial expression full, symmetric.      CN VIII   CN VIII normal.   Hearing: intact     CN XII   CN XII normal.   Nl fundi      Motor Exam   Muscle bulk: normal  Overall muscle tone: normal     Sensory Exam   Light touch normal.   Vibration normal.   Right leg pinprick: normal  Left leg pinprick: normal     Gait, Coordination, and Reflexes      Gait  Gait: normal     Coordination   Romberg: negative  Finger to nose coordination: normal  Heel to shin coordination: normal     Tremor   Resting tremor: absent  Intention tremor: absent  Action tremor: absent     Reflexes     Right patellar: 1+  Left patellar: 1+  Right achilles: 1+  Left achilles: 1+        Physical Exam   Constitutional: She is oriented to person, place, and time. She appears well-developed and well-nourished.   Eyes: Pupils are equal, round, and reactive to light.   Neck: Normal range of motion. Neck supple.   Cardiovascular: Normal rate.    Pulmonary/Chest: Effort normal.   Neurological: She is oriented  to person, place, and time. She has a normal Finger-Nose-Finger Test,  BACK- tender on lower left. Motor exam is grade 5 for both LE HE K KF AE AF.  Sensation-nl LT and NL vibration both feet and legs. Neg SLR.  Psychiatric: She has a normal mood and affect. Her speech is normal and behavior is normal. Judgment and thought content normal.   Nursing note and vitals reviewed.    Assessment/Plan:       Problems Addressed this Visit        Unprioritized    Partial idiopathic epilepsy with seizures of localized onset, not intractable, without status epilepticus (CMS/HCC) - Primary    Relevant Medications    lamoTRIgine (LaMICtal) 100 MG tablet dispersible disintegrating tablet    Other Relevant Orders    Lamotrigine Level    Reactive depression    Cognitive disorder    Chronic left-sided low back pain without sciatica    Relevant Orders    Ambulatory Referral to Physical Therapy Ortho           Epilepsy- due to her incredible weight loss with the keto diet she will probably be able to tolerate a lower dose of lamotrigine which may help with insomnia.  I am going to change to the 100 mg tablets, 3 in the morning and 1 at dinner.  This represents a 20% dose reduction.  Her last blood level was 9, in June, and will be repeated in early December.  She will call me at that time to check on the results.    She remains on her depression medicine.    Low back pain will require physical therapy evaluation and this is been ordered at [facility] rehab which is near her home.  She has seen a chiropractor in the past.

## 2019-12-06 ENCOUNTER — TELEPHONE (OUTPATIENT)
Dept: NEUROLOGY | Facility: CLINIC | Age: 59
End: 2019-12-06

## 2019-12-06 DIAGNOSIS — F32.A DEPRESSION, UNSPECIFIED DEPRESSION TYPE: ICD-10-CM

## 2019-12-06 RX ORDER — LAMOTRIGINE 100 MG/1
100 TABLET ORAL DAILY
Qty: 75 TABLET | Refills: 12 | Status: SHIPPED | OUTPATIENT
Start: 2019-12-06 | End: 2020-04-28 | Stop reason: SDUPTHER

## 2019-12-06 RX ORDER — DESVENLAFAXINE SUCCINATE 50 MG/1
50 TABLET, EXTENDED RELEASE ORAL DAILY
Qty: 30 TABLET | Refills: 10 | Status: SHIPPED | OUTPATIENT
Start: 2019-12-06 | End: 2020-12-16 | Stop reason: SDUPTHER

## 2019-12-06 NOTE — TELEPHONE ENCOUNTER
----- Message from Sudha Waller sent at 12/6/2019 11:23 AM EST -----  Contact: Patient   Pt called with [facility] on the line. Pt wants the REGULAR tables for her   lamoTRIgine (LaMICtal) 100 MG 4 tablets a day.    Pt do NOT want the dispersible disintegrating tablets. Pt needs a new RX for the regular tables. Please   Also she wants both medications listed sent to [facility] she no longer wants to use [facility].     Thank You       Medications Needed:   lamoTRIgine (LaMICtal) 100 MG 4 tablets a day   desvenlafaxine (PRISTIQ) 50 MG 24 hr tablet     [facility]  [address]  [place], [place]

## 2020-03-13 ENCOUNTER — RESULTS ENCOUNTER (OUTPATIENT)
Dept: INTERNAL MEDICINE | Facility: CLINIC | Age: 60
End: 2020-03-13

## 2020-03-13 ENCOUNTER — OFFICE VISIT (OUTPATIENT)
Dept: INTERNAL MEDICINE | Facility: CLINIC | Age: 60
End: 2020-03-13

## 2020-03-13 VITALS
HEIGHT: 63 IN | DIASTOLIC BLOOD PRESSURE: 70 MMHG | OXYGEN SATURATION: 98 % | HEART RATE: 86 BPM | WEIGHT: 141 LBS | BODY MASS INDEX: 24.98 KG/M2 | SYSTOLIC BLOOD PRESSURE: 120 MMHG

## 2020-03-13 DIAGNOSIS — Z11.3 SCREENING FOR STD (SEXUALLY TRANSMITTED DISEASE): ICD-10-CM

## 2020-03-13 DIAGNOSIS — Z12.11 SCREENING FOR COLON CANCER: ICD-10-CM

## 2020-03-13 DIAGNOSIS — G40.009 PARTIAL IDIOPATHIC EPILEPSY WITH SEIZURES OF LOCALIZED ONSET, NOT INTRACTABLE, WITHOUT STATUS EPILEPTICUS (HCC): Primary | ICD-10-CM

## 2020-03-13 PROCEDURE — 99213 OFFICE O/P EST LOW 20 MIN: CPT | Performed by: NURSE PRACTITIONER

## 2020-03-13 NOTE — PROGRESS NOTES
Subjective   Jessica Colon is a 59 y.o. female. Patient is here today for   Chief Complaint   Patient presents with   • Follow-up     Pt here to discuss STD testing.    .    History of Present Illness   Patient is here to request STD testing.  She is in a new relationship and she and her partner would like to both be tested for STDs prior to intercourse.  Patient denies any symptoms.    Patient also has a history of seizures and sees neurology.  She is needing to have her lamictal level checked and is wondering if she can have that done today.     Patient is also needing an order for Cologuard.  States that she had ordered in the past, but has never received it.    The following portions of the patient's history were reviewed and updated as appropriate: allergies, current medications, past family history, past medical history, past social history, past surgical history and problem list.    Review of Systems   Constitutional: Negative.    Respiratory: Negative.    Cardiovascular: Negative.    Genitourinary: Negative.    Neurological: Positive for seizures.   Psychiatric/Behavioral: Negative.        Objective   Vitals:    03/13/20 1106   BP: 120/70   Pulse: 86   SpO2: 98%     Body mass index is 24.98 kg/m².  Physical Exam   Constitutional: Vital signs are normal. She appears well-developed and well-nourished.   Cardiovascular: Normal rate, regular rhythm and normal heart sounds.   Pulmonary/Chest: Effort normal and breath sounds normal.   Skin: Skin is warm, dry and intact.   Psychiatric: She has a normal mood and affect. Her speech is normal and behavior is normal. Thought content normal.   Nursing note and vitals reviewed.      Assessment/Plan   Jessica was seen today for follow-up.    Diagnoses and all orders for this visit:    Partial idiopathic epilepsy with seizures of localized onset, not intractable, without status epilepticus (CMS/[facility])  -     Lamotrigine level    Screening for STD (sexually transmitted  disease)  -     Hepatitis C Antibody  -     HIV-1/O/2 Ag/Ab w Reflex  -     Chlamydia trachomatis, Neisseria gonorrhoeae, Trichomonas vaginalis, PCR - Urine, Urine, Clean Catch    Screening for colon cancer  -     Cologuard - Stool, Per Rectum; Future

## 2020-03-16 LAB
HCV AB S/CO SERPL IA: <0.1 S/CO RATIO (ref 0–0.9)
HIV 1+2 AB+HIV1 P24 AG SERPL QL IA: NON REACTIVE
LAMOTRIGINE SERPL-MCNC: 7.1 UG/ML (ref 2–20)

## 2020-03-17 LAB
C TRACH RRNA SPEC QL NAA+PROBE: NEGATIVE
N GONORRHOEA RRNA SPEC QL NAA+PROBE: NEGATIVE
T VAGINALIS DNA SPEC QL NAA+PROBE: NEGATIVE

## 2020-04-27 ENCOUNTER — TELEPHONE (OUTPATIENT)
Dept: NEUROLOGY | Facility: CLINIC | Age: 60
End: 2020-04-27

## 2020-04-27 NOTE — TELEPHONE ENCOUNTER
PT CALLED TO REFILL LAMOTRIGINE RX FROM DR. DEL RIO.  STATED SHE IS TAKING 400 MG PER DAY, 200 MG AM, 200 MG PM.  NEEDS A NEW PRESCRIPTION TO REFLECT THIS DOSAGE.    PHARMACY  [address]  [phone]    PT WAS LAST SEEN BY DR DEL RIO 11-14-90    PT CURRENTLY HAS ENOUGH ON HAND THROUGH Wednesday AM.      PLEASE CALL WHEN RX IS ORDERED: [phone], LEAVE VM IF NO ANSWER.

## 2020-04-28 RX ORDER — LAMOTRIGINE 100 MG/1
TABLET ORAL
Qty: 120 TABLET | Refills: 2 | Status: CANCELLED | OUTPATIENT
Start: 2020-04-28

## 2020-04-28 RX ORDER — LAMOTRIGINE 100 MG/1
200 TABLET ORAL 2 TIMES DAILY
Qty: 120 TABLET | Refills: 11 | Status: SHIPPED | OUTPATIENT
Start: 2020-04-28 | End: 2021-01-15 | Stop reason: SDUPTHER

## 2020-04-28 RX ORDER — LAMOTRIGINE 100 MG/1
200 TABLET ORAL 2 TIMES DAILY
Qty: 75 TABLET | Refills: 12 | Status: SHIPPED | OUTPATIENT
Start: 2020-04-28 | End: 2020-04-28 | Stop reason: SDUPTHER

## 2020-12-16 DIAGNOSIS — F32.A DEPRESSION, UNSPECIFIED DEPRESSION TYPE: ICD-10-CM

## 2020-12-17 RX ORDER — DESVENLAFAXINE SUCCINATE 50 MG/1
50 TABLET, EXTENDED RELEASE ORAL DAILY
Qty: 30 TABLET | Refills: 0 | Status: SHIPPED | OUTPATIENT
Start: 2020-12-17 | End: 2021-02-23 | Stop reason: SDUPTHER

## 2021-01-15 ENCOUNTER — OFFICE VISIT (OUTPATIENT)
Dept: NEUROLOGY | Facility: CLINIC | Age: 61
End: 2021-01-15

## 2021-01-15 DIAGNOSIS — G40.009 PARTIAL IDIOPATHIC EPILEPSY WITH SEIZURES OF LOCALIZED ONSET, NOT INTRACTABLE, WITHOUT STATUS EPILEPTICUS (HCC): Primary | ICD-10-CM

## 2021-01-15 PROCEDURE — 99443 PR PHYS/QHP TELEPHONE EVALUATION 21-30 MIN: CPT | Performed by: NURSE PRACTITIONER

## 2021-01-15 RX ORDER — LAMOTRIGINE 100 MG/1
TABLET ORAL
Qty: 120 TABLET | Refills: 11 | Status: SHIPPED | OUTPATIENT
Start: 2021-01-15 | End: 2022-02-07

## 2021-01-15 NOTE — PROGRESS NOTES
Subjective:     Patient ID: Jessica Colon is a 60 y.o. female presenting for follow up for epilepsy. She is a previous patient of Dr. Celestin. She had the onset of partial complex seizures at age 10, idiopathic, without any timing or context issues. She is on Lamictal 300 mg in the morning 100 mg in the evening. She has not had a seizure in over 20 years. Her seizures are described as an odd sensation of flushing and warmth followed by reduced responsiveness and then postictal fatigue.     The patient was seen via video visit today. She consented to this type of visit. All questioned were answered prior to starting. Total time of telephone visit was 25 minutes.     History of Present Illness  The following portions of the patient's history were reviewed and updated as appropriate: allergies, current medications, past family history, past medical history, past social history, past surgical history and problem list.    Review of Systems   Constitutional: Negative for chills, fatigue and fever.   HENT: Negative for hearing loss, sore throat and trouble swallowing.    Eyes: Negative for pain and itching.   Respiratory: Negative for cough, shortness of breath and wheezing.    Cardiovascular: Negative for chest pain, palpitations and leg swelling.   Gastrointestinal: Negative for diarrhea, nausea and vomiting.   Endocrine: Negative for cold intolerance, heat intolerance and polyphagia.   Genitourinary: Negative for decreased urine volume, dyspareunia and urgency.   Musculoskeletal: Negative for back pain and neck stiffness.   Skin: Negative for color change, rash and wound.   Allergic/Immunologic: Negative for environmental allergies, food allergies and immunocompromised state.   Neurological: Negative for dizziness, tremors, seizures, syncope, facial asymmetry, speech difficulty, weakness, light-headedness, numbness and headaches.   Hematological: Negative for adenopathy. Does not bruise/bleed easily.    Psychiatric/Behavioral: Negative for confusion, decreased concentration and sleep disturbance. The patient is nervous/anxious.         Objective:    Neurologic Exam  Not performed due to limitations of telephone visit.     Physical Exam    Assessment/Plan:     Diagnoses and all orders for this visit:    1. Partial idiopathic epilepsy with seizures of localized onset, not intractable, without status epilepticus (CMS/HCC) (Primary)    Other orders  -     lamoTRIgine (LaMICtal) 100 MG tablet; Take 3 tablets by mouth in the morning and 1 tablet by mouth in the evening.  Dispense: 120 tablet; Refill: 11        She is doing well. No changes made today. Continue lamotrigine 300 mg in the morning 100 mg in the evening. She will call with any problems, will f/u in 1 year, sooner if needed. Seizure precautions discussed. I did recommend she start an exercise regimen to help with anxiety during Covid.

## 2021-02-02 ENCOUNTER — TELEPHONE (OUTPATIENT)
Dept: NEUROLOGY | Facility: CLINIC | Age: 61
End: 2021-02-02

## 2021-02-02 NOTE — TELEPHONE ENCOUNTER
Caller: PT    Relationship: SELF    Best call back number: [phone]    What form or medical record are you requesting: FMLA    Who is requesting this form or medical record from you: DEREK    How would you like to receive the form or medical records (pick-up, mail, fax): FAX  If fax, what is the fax number: PATIENT STATES IT WILL BE ON THE PAPERWORK THEY ARE SENDING    Timeframe paperwork needed: ONCE FAXED TO OFFICE    Additional notes: PATIENT STATES SHE WOULD LIKE TO SPEAK WITH KATLIN FINLEY ABOUT GETTING SOME TIME OFF. SHE IS NOT FEELING WELL DUE TO LOSING HER  AND HER DAD IN THE PAST COUPLE OF YEARS. PLEASE ADVISE.

## 2021-02-22 ENCOUNTER — TELEPHONE (OUTPATIENT)
Dept: INTERNAL MEDICINE | Facility: CLINIC | Age: 61
End: 2021-02-22

## 2021-02-22 NOTE — TELEPHONE ENCOUNTER
PATIENT IS EXPERIENCING DEPRESSION SYMPTOMS.  5 DEATHS IN 3 YEARS.  SEEING A COUNSELOR.    PLEASE ADVISE    CALL BACK #: [phone]    PHARMACY: [facility] [address] [place] [address] RD AT [facility] & [address] - [phone] [facility] [phone] FX

## 2021-02-23 ENCOUNTER — TELEMEDICINE (OUTPATIENT)
Dept: INTERNAL MEDICINE | Facility: CLINIC | Age: 61
End: 2021-02-23

## 2021-02-23 VITALS — HEIGHT: 63 IN | BODY MASS INDEX: 28.53 KG/M2 | WEIGHT: 161 LBS

## 2021-02-23 DIAGNOSIS — F32.A DEPRESSION, UNSPECIFIED DEPRESSION TYPE: ICD-10-CM

## 2021-02-23 PROCEDURE — 99213 OFFICE O/P EST LOW 20 MIN: CPT | Performed by: NURSE PRACTITIONER

## 2021-02-23 RX ORDER — ESTRADIOL 0.1 MG/G
1 CREAM VAGINAL 2 TIMES WEEKLY
COMMUNITY
Start: 2020-05-28 | End: 2021-05-28

## 2021-02-23 RX ORDER — DESVENLAFAXINE 100 MG/1
100 TABLET, EXTENDED RELEASE ORAL DAILY
Qty: 30 TABLET | Refills: 4 | Status: SHIPPED | OUTPATIENT
Start: 2021-02-23 | End: 2021-03-15 | Stop reason: SDUPTHER

## 2021-02-23 NOTE — PROGRESS NOTES
Subjective   Jessica Colon is a 60 y.o. female. Patient is here today for   Chief Complaint   Patient presents with   • Depression     Pt wanting to discuss depression.    .    History of Present Illness   You have chosen to receive care through a telehealth visit.  Do you consent to use a video/audio connection for your medical care today? Yes    Patient would like to discuss depression. She has had  several deaths in her family, including her  and father over the past couple of years.   She took off work for awhile due to stress. Going back to work March 1st Sun-Wed. Works at [employer]).  Drinking too much red wine. Memory is not as good. Seeing neurology. Patient is currently on Pristiq 50 mg   Seeing a counselor.     The following portions of the patient's history were reviewed and updated as appropriate: allergies, current medications, past family history, past medical history, past social history, past surgical history and problem list.    Review of Systems    Objective   There were no vitals filed for this visit.  Body mass index is 28.53 kg/m².  Physical Exam  Nursing note reviewed.   Constitutional:       Appearance: Normal appearance. She is well-developed.   Pulmonary:      Effort: Pulmonary effort is normal.   Neurological:      Mental Status: She is alert and oriented to person, place, and time.   Psychiatric:         Mood and Affect: Mood normal.         Speech: Speech normal.         Behavior: Behavior normal.         Thought Content: Thought content normal.         Assessment/Plan   Diagnoses and all orders for this visit:    1. Depression, unspecified depression type  -     desvenlafaxine (PRISTIQ) 100 MG 24 hr tablet; Take 1 tablet by mouth Daily.  Dispense: 30 tablet; Refill: 4        F/u in one month to reassess depression and memory issues.

## 2021-03-15 DIAGNOSIS — F32.A DEPRESSION, UNSPECIFIED DEPRESSION TYPE: ICD-10-CM

## 2021-03-15 NOTE — TELEPHONE ENCOUNTER
Caller: Jessica Colon    Relationship: Self    Best call back number: [phone]    Medication needed:   Requested Prescriptions     Pending Prescriptions Disp Refills   • desvenlafaxine (PRISTIQ) 100 MG 24 hr tablet 30 tablet 4     Sig: Take 1 tablet by mouth Daily.       When do you need the refill by: 03/15    What details did the patient provide when requesting the medication: PATIENT WAS ACCIDENTALLY TAKING DOUBLE THE DOSAGE. NOW HER NEXT REFILL IS SCHEDULED FOR 03/21 AND SHE ONLY HAS ENOUGH FOR TODAY THEN SHE WILL BE OUT. SHE CAN NOT GO 10 DAYS WITHOUT THIS MEDICATION. SHE SAID SHE DID HAVE TO GO WITHOUT THEM ONCE FOR A CPLE DAYS AND SHE WAS MISERABLE AND SUPER DIZZY.  CAN WE GIVE PATIENT AN EARLIER FILL?    Does the patient have less than a 3 day supply:  [x] Yes  [] No    What is the patient's preferred pharmacy: [facility] [address] [facility] [address]  [phone] [facility] [phone] FX

## 2021-03-16 ENCOUNTER — BULK ORDERING (OUTPATIENT)
Dept: CASE MANAGEMENT | Facility: OTHER | Age: 61
End: 2021-03-16

## 2021-03-16 DIAGNOSIS — Z23 IMMUNIZATION DUE: ICD-10-CM

## 2021-03-16 RX ORDER — DESVENLAFAXINE 100 MG/1
100 TABLET, EXTENDED RELEASE ORAL DAILY
Qty: 30 TABLET | Refills: 1 | Status: SHIPPED | OUTPATIENT
Start: 2021-03-16 | End: 2021-08-30

## 2021-03-26 ENCOUNTER — HOSPITAL ENCOUNTER (OUTPATIENT)
Dept: GENERAL RADIOLOGY | Facility: HOSPITAL | Age: 61
Discharge: HOME | End: 2021-03-26
Admitting: NURSE PRACTITIONER

## 2021-03-26 ENCOUNTER — OFFICE VISIT (OUTPATIENT)
Dept: INTERNAL MEDICINE | Facility: CLINIC | Age: 61
End: 2021-03-26

## 2021-03-26 VITALS
HEART RATE: 85 BPM | BODY MASS INDEX: 30.48 KG/M2 | OXYGEN SATURATION: 98 % | WEIGHT: 172 LBS | HEIGHT: 63 IN | DIASTOLIC BLOOD PRESSURE: 70 MMHG | SYSTOLIC BLOOD PRESSURE: 122 MMHG

## 2021-03-26 DIAGNOSIS — M25.552 BILATERAL HIP PAIN: ICD-10-CM

## 2021-03-26 DIAGNOSIS — F32.9 REACTIVE DEPRESSION: Primary | ICD-10-CM

## 2021-03-26 DIAGNOSIS — M25.551 BILATERAL HIP PAIN: ICD-10-CM

## 2021-03-26 PROCEDURE — 73521 X-RAY EXAM HIPS BI 2 VIEWS: CPT

## 2021-03-26 PROCEDURE — 99214 OFFICE O/P EST MOD 30 MIN: CPT | Performed by: NURSE PRACTITIONER

## 2021-03-26 NOTE — PROGRESS NOTES
"Subjective   Jessica Colon is a 60 y.o. female. Patient is here today for   Chief Complaint   Patient presents with   • Depression     Pt here to follow up on Depression.    .    History of Present Illness   Patient is here to follow up on depression. She is doing better with Pristiq at 100 mg daily.    C/o bilateral hip pain for awhile that seems to be getting worse. She also has back pain and is doing physical therapy exercises at home which help. Ibuprofen helps some.  She does have a history of a labral tear about 15 years ago.     C/o issues with her memory, but is getting worse over the past couple of years. Word finding is hard at times. \"Brain fog\" every once in awhile.  History of seizures and sees neurology  She has gained 31 pounds since last year.  Her significant other recently had cardiac bypass surgery and is going to be starting on a Mediterranean diet.  Patient is hoping that this will help her to lose some weight       The following portions of the patient's history were reviewed and updated as appropriate: allergies, current medications, past family history, past medical history, past social history, past surgical history and problem list.    Review of Systems    Objective   Vitals:    03/26/21 1434   BP: 122/70   Pulse: 85   SpO2: 98%     Body mass index is 30.48 kg/m².  Physical Exam  Vitals and nursing note reviewed.   Constitutional:       Appearance: Normal appearance. She is well-developed.   Cardiovascular:      Rate and Rhythm: Normal rate and regular rhythm.      Heart sounds: Normal heart sounds.   Pulmonary:      Effort: Pulmonary effort is normal.      Breath sounds: Normal breath sounds.   Musculoskeletal:      Right hip: Bony tenderness present. Normal range of motion.      Left hip: Bony tenderness present. Normal range of motion.   Skin:     General: Skin is warm and dry.   Neurological:      Mental Status: She is alert.   Psychiatric:         Speech: Speech normal.         " Behavior: Behavior normal.         Thought Content: Thought content normal.         Assessment/Plan   Diagnoses and all orders for this visit:    1. Reactive depression (Primary)    2. Bilateral hip pain  -     XR Hips Bilateral With or Without Pelvis 2 View; Future    Depression - continue with Pristiq. She doesn't need a refill at this time    Hip pain - will check x-ray. Will likely need a referral to ortho.    F/u in a few months for a physical and fasting labs    Memory issues - she will follow up with neurology since she is already established with them for treatment of her seizures

## 2021-03-27 ENCOUNTER — IMMUNIZATION (OUTPATIENT)
Dept: VACCINE CLINIC | Facility: HOSPITAL | Age: 61
End: 2021-03-27

## 2021-03-27 DIAGNOSIS — Z23 IMMUNIZATION DUE: ICD-10-CM

## 2021-03-27 PROCEDURE — 0001A: CPT | Performed by: INTERNAL MEDICINE

## 2021-03-27 PROCEDURE — 91300 HC SARSCOV02 VAC 30MCG/0.3ML IM: CPT | Performed by: INTERNAL MEDICINE

## 2021-04-17 ENCOUNTER — IMMUNIZATION (OUTPATIENT)
Dept: VACCINE CLINIC | Facility: HOSPITAL | Age: 61
End: 2021-04-17

## 2021-04-17 PROCEDURE — 91300 HC SARSCOV02 VAC 30MCG/0.3ML IM: CPT | Performed by: INTERNAL MEDICINE

## 2021-04-17 PROCEDURE — 0002A: CPT | Performed by: INTERNAL MEDICINE

## 2021-04-19 ENCOUNTER — OFFICE VISIT (OUTPATIENT)
Dept: INTERNAL MEDICINE | Facility: CLINIC | Age: 61
End: 2021-04-19

## 2021-04-19 VITALS
HEIGHT: 63 IN | TEMPERATURE: 96.9 F | BODY MASS INDEX: 30.48 KG/M2 | DIASTOLIC BLOOD PRESSURE: 70 MMHG | HEART RATE: 84 BPM | SYSTOLIC BLOOD PRESSURE: 118 MMHG | OXYGEN SATURATION: 98 % | WEIGHT: 172 LBS

## 2021-04-19 DIAGNOSIS — J02.9 ACUTE PHARYNGITIS, UNSPECIFIED ETIOLOGY: Primary | ICD-10-CM

## 2021-04-19 LAB
EXPIRATION DATE: NORMAL
INTERNAL CONTROL: NORMAL
Lab: NORMAL
S PYO AG THROAT QL: NEGATIVE

## 2021-04-19 PROCEDURE — 87880 STREP A ASSAY W/OPTIC: CPT | Performed by: NURSE PRACTITIONER

## 2021-04-19 PROCEDURE — 99213 OFFICE O/P EST LOW 20 MIN: CPT | Performed by: NURSE PRACTITIONER

## 2021-04-19 RX ORDER — AMOXICILLIN 875 MG/1
875 TABLET, COATED ORAL 2 TIMES DAILY
Qty: 20 TABLET | Refills: 0 | Status: SHIPPED | OUTPATIENT
Start: 2021-04-19 | End: 2022-01-04

## 2021-04-19 NOTE — PROGRESS NOTES
Subjective   Jessica Colon is a 60 y.o. female. Patient is here today for   Chief Complaint   Patient presents with   • Sore Throat     Pt complains of having ST, productive cough & chest congestion x5 days.    .    History of Present Illness   C/o sore throat x 5 days associated with cough, chest congestion, post-nasal drainage. Cough is occasionally productive. She has taken xyzal daily. Denies fever.   Received both doses of her Covid vaccine  No wheezing or shortness of breath    The following portions of the patient's history were reviewed and updated as appropriate: allergies, current medications, past family history, past medical history, past social history, past surgical history and problem list.    Review of Systems    Objective   Vitals:    04/19/21 1626   BP: 118/70   Pulse: 84   Temp: 96.9 °F (36.1 °C)   SpO2: 98%     Body mass index is 30.48 kg/m².  Physical Exam  Vitals and nursing note reviewed.   Constitutional:       Appearance: Normal appearance. She is well-developed.   HENT:      Right Ear: Tympanic membrane and ear canal normal.      Left Ear: A middle ear effusion is present.      Mouth/Throat:      Pharynx: Posterior oropharyngeal erythema present. No oropharyngeal exudate.   Cardiovascular:      Rate and Rhythm: Normal rate and regular rhythm.      Heart sounds: Normal heart sounds.   Pulmonary:      Effort: Pulmonary effort is normal.      Breath sounds: Normal breath sounds.   Skin:     General: Skin is warm and dry.   Neurological:      Mental Status: She is alert and oriented to person, place, and time.   Psychiatric:         Speech: Speech normal.         Behavior: Behavior normal.         Thought Content: Thought content normal.       Results for orders placed or performed in visit on 04/19/21   POC Rapid Strep A    Specimen: Swab   Result Value Ref Range    Rapid Strep A Screen Negative Negative, VALID, INVALID, Not Performed    Internal Control Passed Passed    Lot Number  [id number]     Expiration Date 04/30/2021        Assessment/Plan   Diagnoses and all orders for this visit:    1. Acute pharyngitis, unspecified etiology (Primary)  -     POC Rapid Strep A  -     amoxicillin (AMOXIL) 875 MG tablet; Take 1 tablet by mouth 2 (Two) Times a Day.  Dispense: 20 tablet; Refill: 0    Follow-up if symptoms do not improve

## 2021-08-28 DIAGNOSIS — F32.A DEPRESSION, UNSPECIFIED DEPRESSION TYPE: ICD-10-CM

## 2021-08-30 RX ORDER — DESVENLAFAXINE 100 MG/1
TABLET, EXTENDED RELEASE ORAL
Qty: 30 TABLET | Refills: 1 | Status: SHIPPED | OUTPATIENT
Start: 2021-08-30 | End: 2021-12-10

## 2021-12-10 DIAGNOSIS — F32.A DEPRESSION, UNSPECIFIED DEPRESSION TYPE: ICD-10-CM

## 2021-12-10 RX ORDER — DESVENLAFAXINE 100 MG/1
TABLET, EXTENDED RELEASE ORAL
Qty: 30 TABLET | Refills: 1 | Status: SHIPPED | OUTPATIENT
Start: 2021-12-10 | End: 2022-01-11

## 2022-01-03 ENCOUNTER — TELEPHONE (OUTPATIENT)
Dept: INTERNAL MEDICINE | Facility: CLINIC | Age: 62
End: 2022-01-03

## 2022-01-03 NOTE — TELEPHONE ENCOUNTER
Caller: Jessica Colon    Relationship: Self    Best call back number: [phone] (H)    What is the best time to reach you: ANYTIME    Who are you requesting to speak with (clinical staff, provider,  specific staff member): LYNNE ENCARNACION    Do you know the name of the person who called:     What was the call regarding: MEDICAL ADVISE NEEDED- PATIENT IS NOT FEELING WELL HAS HAD 3 COVID TEST DONE AND THEY ARE COMING BACK NEGATIVE, BUT STATES THAT SHE DOES NOT WANT TO GO BACK TO WORK AND GIVE WHAT SHE HAS TO SOME ONE ELSE. PATIENT IS REQUESTING TO CALLBACK TO DISCUSS    Do you require a callback: YES        THANKS

## 2022-01-04 ENCOUNTER — TELEMEDICINE (OUTPATIENT)
Dept: INTERNAL MEDICINE | Facility: CLINIC | Age: 62
End: 2022-01-04

## 2022-01-04 VITALS — WEIGHT: 170 LBS | BODY MASS INDEX: 30.12 KG/M2 | HEIGHT: 63 IN | TEMPERATURE: 98.6 F

## 2022-01-04 DIAGNOSIS — J06.9 ACUTE URI: Primary | ICD-10-CM

## 2022-01-04 PROCEDURE — 99213 OFFICE O/P EST LOW 20 MIN: CPT | Performed by: NURSE PRACTITIONER

## 2022-01-04 RX ORDER — FLUTICASONE PROPIONATE 50 MCG
2 SPRAY, SUSPENSION (ML) NASAL AS NEEDED
COMMUNITY
Start: 2022-01-02

## 2022-01-04 NOTE — PROGRESS NOTES
Subjective   Jessica Colon is a 61 y.o. female. Patient is here today for   Chief Complaint   Patient presents with   • URI     Pt complains of having bodyaches, headaches, nasal congestion, productive cough & diarrhea x2.5 weeks. Pt has been covid tested 4 times since being sick & all were negative.    .    History of Present Illness   You have chosen to receive care through a telehealth visit.  Do you consent to use a video/audio connection for your medical care today? Yes    Patient is located at home  Provider is located at office at [facility]    C/o body aches associated with nasal congestion, headaches, cough, fatigue. Denies fever, loss of taste or smell. She also had some diarrhea. She has had these symptoms for 2.5 weeks, but is actually feeling better tody. She has had 4 neg Covid tests    She is needing a note to return to work on Sunday, January 9, 2022    The following portions of the patient's history were reviewed and updated as appropriate: allergies, current medications, past family history, past medical history, past social history, past surgical history and problem list.    Review of Systems    Objective   Vitals:    01/04/22 1507   Temp: 98.6 °F (37 °C)     Body mass index is 30.12 kg/m².  Physical Exam  Vitals and nursing note reviewed.   Constitutional:       Appearance: Normal appearance. She is well-developed.   Pulmonary:      Effort: Pulmonary effort is normal.   Neurological:      Mental Status: She is alert and oriented to person, place, and time.   Psychiatric:         Speech: Speech normal.         Behavior: Behavior normal.         Thought Content: Thought content normal.         Assessment/Plan   Diagnoses and all orders for this visit:    1. Acute URI (Primary)      Patient symptoms have improved.  No treatment needed at this time.  We will send patient a note to return to work on Sunday, January 9.

## 2022-01-11 DIAGNOSIS — F32.A DEPRESSION, UNSPECIFIED DEPRESSION TYPE: ICD-10-CM

## 2022-01-11 RX ORDER — DESVENLAFAXINE 100 MG/1
TABLET, EXTENDED RELEASE ORAL
Qty: 30 TABLET | Refills: 0 | Status: SHIPPED | OUTPATIENT
Start: 2022-01-11 | End: 2022-02-15 | Stop reason: SDUPTHER

## 2022-01-12 ENCOUNTER — TELEPHONE (OUTPATIENT)
Dept: INTERNAL MEDICINE | Facility: CLINIC | Age: 62
End: 2022-01-12

## 2022-01-12 NOTE — TELEPHONE ENCOUNTER
Caller: Jesisca Colon    Relationship: Self    Best call back number: [phone]*    What form or medical record are you requesting: WORK EXCUSE    Who is requesting this form or medical record from you: PATIENT'S EMPLOYER    How would you like to receive the form or medical records (pick-up, mail, fax):PATIENT REQUEST VIA  [employer] IF POSSIBLE, BUT IF NOT, PLEASE CALL PATIENT BACK.    Timeframe paperwork needed: ASAP    Additional notes: TIME PERIOD OFF DUE TO COVID 1/8 THROUGH 1/15, RETURNING TO WORK ON 1/16.

## 2022-01-13 NOTE — TELEPHONE ENCOUNTER
LM for patient that her note was only until González 1/3/2022. Directed to call back for any changes.

## 2022-02-07 RX ORDER — LAMOTRIGINE 100 MG/1
TABLET ORAL
Qty: 120 TABLET | Refills: 11 | Status: SHIPPED | OUTPATIENT
Start: 2022-02-07 | End: 2023-03-01

## 2022-02-15 ENCOUNTER — TELEPHONE (OUTPATIENT)
Dept: NEUROLOGY | Facility: CLINIC | Age: 62
End: 2022-02-15

## 2022-02-15 ENCOUNTER — TELEPHONE (OUTPATIENT)
Dept: INTERNAL MEDICINE | Facility: CLINIC | Age: 62
End: 2022-02-15

## 2022-02-15 DIAGNOSIS — F32.A DEPRESSION, UNSPECIFIED DEPRESSION TYPE: ICD-10-CM

## 2022-02-15 RX ORDER — DESVENLAFAXINE 100 MG/1
TABLET, EXTENDED RELEASE ORAL
Qty: 30 TABLET | Refills: 0 | Status: SHIPPED | OUTPATIENT
Start: 2022-02-15 | End: 2022-03-10 | Stop reason: SDUPTHER

## 2022-02-15 NOTE — TELEPHONE ENCOUNTER
Caller: PAVITHRA PARKER    Relationship: SELF    What medications are you currently taking:   Current Outpatient Medications on File Prior to Visit   Medication Sig Dispense Refill   • betamethasone dipropionate (DIPROLENE) 0.05 % cream Apply  topically to the appropriate area as directed.     • desvenlafaxine (PRISTIQ) 100 MG 24 hr tablet TAKE ONE TABLET BY MOUTH ONCE DAILY. PT NEEDS APPT BEFORE ANY FURTHER REFILLS 30 tablet 0   • fluticasone (FLONASE) 50 MCG/ACT nasal spray 2 sprays into the nostril(s) as directed by provider As Needed.     • lamoTRIgine (LaMICtal) 100 MG tablet TAKE THREE TABLETS BY MOUTH EVERY MORNING AND TAKE ONE TABLET BY MOUTH IN THE EVENING 120 tablet 11   • levocetirizine (XYZAL) 5 MG tablet Take 5 mg by mouth Every Evening.     • XIIDRA 5 % solution 2 (Two) Times a Day. 1 drop in each eye twice daily       No current facility-administered medications on file prior to visit.          Which medication are you concerned about: desvenlafaxine (PRISTIQ) 100 MG 24 hr tablet    Who prescribed you this medication: SHAILA ALVAREZ    What are your concerns: PT CALLING TO SEE IF SHE CAN GET A COUPLE OF desvenlafaxine (PRISTIQ) 100 MG 24 hr tablet  ABOUT 5 PILLS FROM THE PHARMACY, CAUSE SHE CANNOT FIND THE BOTTLE, THINKING THE DOG MAY HAVE GOTTEN A HOLD OF IT. SHE CAN'T GO WITHOUT THIS MEDICATION.      PLEASE CALL HER BACK -[phone]

## 2022-02-15 NOTE — TELEPHONE ENCOUNTER
Caller: Jessica Colon    Relationship: Self    Best call back number: [phone]       Requested Prescriptions:   Requested Prescriptions     Pending Prescriptions Disp Refills   • desvenlafaxine (PRISTIQ) 100 MG 24 hr tablet 30 tablet 0     Sig: TAKE ONE TABLET BY MOUTH ONCE DAILY. PT NEEDS APPT BEFORE ANY FURTHER REFILLS        Pharmacy where request should be sent: [facility] [address] [facility] [phone] [facility] [phone] FX     Additional details provided by patient:     PATIENT HAS LOST THE BOTTLE OF MEDICATION AND NEEDS SOME CALLED INTO THE PHARMACY TO TIE HER OVER UNTIL SHE FINDS THE MISSING MEDIATION.  SHE HAS BEEN WITHOUT THE MEDICATION FOR THREE DAYS AND SHE GETS REAL DIZZY.    ANY QUESTIONS PLEASE CALL PATIENT.      Does the patient have less than a 3 day supply:  [x] Yes  [] No    Sudha Joy Rep   02/15/22 16:21 EST

## 2022-03-10 ENCOUNTER — OFFICE VISIT (OUTPATIENT)
Dept: INTERNAL MEDICINE | Facility: CLINIC | Age: 62
End: 2022-03-10

## 2022-03-10 VITALS
TEMPERATURE: 97.3 F | HEIGHT: 63 IN | WEIGHT: 175.2 LBS | SYSTOLIC BLOOD PRESSURE: 118 MMHG | OXYGEN SATURATION: 100 % | HEART RATE: 89 BPM | DIASTOLIC BLOOD PRESSURE: 86 MMHG | BODY MASS INDEX: 31.04 KG/M2

## 2022-03-10 DIAGNOSIS — F32.A DEPRESSION, UNSPECIFIED DEPRESSION TYPE: ICD-10-CM

## 2022-03-10 DIAGNOSIS — Z12.11 COLON CANCER SCREENING: ICD-10-CM

## 2022-03-10 DIAGNOSIS — F41.9 ANXIETY: Primary | ICD-10-CM

## 2022-03-10 DIAGNOSIS — L65.9 HAIR LOSS: ICD-10-CM

## 2022-03-10 DIAGNOSIS — Z00.00 HEALTHCARE MAINTENANCE: ICD-10-CM

## 2022-03-10 PROCEDURE — 99214 OFFICE O/P EST MOD 30 MIN: CPT | Performed by: NURSE PRACTITIONER

## 2022-03-10 RX ORDER — HYDROXYZINE HYDROCHLORIDE 10 MG/1
10 TABLET, FILM COATED ORAL 3 TIMES DAILY PRN
Qty: 60 TABLET | Refills: 1 | Status: SHIPPED | OUTPATIENT
Start: 2022-03-10 | End: 2022-05-16

## 2022-03-10 RX ORDER — DESVENLAFAXINE 100 MG/1
TABLET, EXTENDED RELEASE ORAL
Qty: 90 TABLET | Refills: 3 | Status: SHIPPED | OUTPATIENT
Start: 2022-03-10 | End: 2023-03-13

## 2022-03-10 NOTE — PROGRESS NOTES
Subjective   Jessica Colon is a 61 y.o. female. Patient is here today for   Chief Complaint   Patient presents with   • life choices dealing with hair loss stress   .    History of Present Illness   Patient is here to follow up on anxiety and depression. She is doing okay with depression and taking Prestiq. She has had some increased anxiety, including lack of focus, procrastination. She has had a lot of deaths among her family and friends recently.   She does see a counselor once a month - Bo Cuevas    Patient c/o increased hair loss recently. She does have an appointment with dermatology soon.    Patient is overdue for colon cancer screening and fasting labs    The following portions of the patient's history were reviewed and updated as appropriate: allergies, current medications, past family history, past medical history, past social history, past surgical history and problem list.    Review of Systems    Objective   Vitals:    03/10/22 0934   BP: 118/86   Pulse: 89   Temp: 97.3 °F (36.3 °C)   SpO2: 100%     Body mass index is 31.04 kg/m².  Physical Exam  Vitals and nursing note reviewed.   Constitutional:       Appearance: She is well-developed.   Cardiovascular:      Rate and Rhythm: Normal rate and regular rhythm.      Heart sounds: Normal heart sounds.   Pulmonary:      Effort: Pulmonary effort is normal.      Breath sounds: Normal breath sounds.   Skin:     General: Skin is warm and dry.   Psychiatric:         Speech: Speech normal.         Behavior: Behavior normal.         Thought Content: Thought content normal.         Assessment/Plan   Diagnoses and all orders for this visit:    1. Anxiety (Primary)  -     hydrOXYzine (ATARAX) 10 MG tablet; Take 1 tablet by mouth 3 (Three) Times a Day As Needed for Anxiety.  Dispense: 60 tablet; Refill: 1    2. Depression, unspecified depression type  -     desvenlafaxine (PRISTIQ) 100 MG 24 hr tablet; TAKE ONE TABLET BY MOUTH ONCE DAILY.  Dispense: 90 tablet;  Refill: 3    3. Hair loss  -     CBC & Differential; Future  -     TSH Rfx On Abnormal To Free T4; Future  -     Vitamin B12; Future  -     Vitamin D 25 Hydroxy; Future    4. Colon cancer screening  -     Cologuard - Stool, Per Rectum; Future    5. Healthcare maintenance  -     Comprehensive Metabolic Panel; Future  -     Lipid Panel With / Chol / HDL Ratio; Future    anxiety - will prescribe hydroxyzine to use as needed    Depression - continue with Pristiq    Hair loss - will check thyroid. It may be also due to stress. She also recently had Covid.     Patient will come in for fasting labs when she comes to have her thyroid checked.

## 2022-03-17 ENCOUNTER — LAB (OUTPATIENT)
Dept: LAB | Facility: HOSPITAL | Age: 62
End: 2022-03-17

## 2022-03-17 PROCEDURE — 82306 VITAMIN D 25 HYDROXY: CPT | Performed by: NURSE PRACTITIONER

## 2022-03-17 PROCEDURE — 80050 GENERAL HEALTH PANEL: CPT | Performed by: NURSE PRACTITIONER

## 2022-03-17 PROCEDURE — 82607 VITAMIN B-12: CPT | Performed by: NURSE PRACTITIONER

## 2022-03-17 PROCEDURE — 80061 LIPID PANEL: CPT | Performed by: NURSE PRACTITIONER

## 2022-03-24 DIAGNOSIS — Z00.00 HEALTHCARE MAINTENANCE: Primary | ICD-10-CM

## 2022-03-24 DIAGNOSIS — R73.9 ELEVATED BLOOD SUGAR: ICD-10-CM

## 2022-03-24 DIAGNOSIS — E53.8 VITAMIN B12 DEFICIENCY: ICD-10-CM

## 2022-03-24 RX ORDER — ERGOCALCIFEROL 1.25 MG/1
50000 CAPSULE ORAL WEEKLY
Qty: 12 CAPSULE | Refills: 0 | Status: SHIPPED | OUTPATIENT
Start: 2022-03-24

## 2022-05-13 DIAGNOSIS — F41.9 ANXIETY: ICD-10-CM

## 2022-05-16 RX ORDER — HYDROXYZINE HYDROCHLORIDE 10 MG/1
TABLET, FILM COATED ORAL
Qty: 60 TABLET | Refills: 1 | Status: SHIPPED | OUTPATIENT
Start: 2022-05-16 | End: 2022-07-21

## 2022-07-21 DIAGNOSIS — F41.9 ANXIETY: ICD-10-CM

## 2022-07-21 RX ORDER — HYDROXYZINE HYDROCHLORIDE 10 MG/1
TABLET, FILM COATED ORAL
Qty: 60 TABLET | Refills: 1 | Status: SHIPPED | OUTPATIENT
Start: 2022-07-21 | End: 2022-09-29

## 2022-09-29 DIAGNOSIS — F41.9 ANXIETY: ICD-10-CM

## 2022-09-29 RX ORDER — HYDROXYZINE HYDROCHLORIDE 10 MG/1
TABLET, FILM COATED ORAL
Qty: 60 TABLET | Refills: 1 | Status: SHIPPED | OUTPATIENT
Start: 2022-09-29

## 2023-02-10 ENCOUNTER — OFFICE VISIT (OUTPATIENT)
Dept: INTERNAL MEDICINE | Facility: CLINIC | Age: 63
End: 2023-02-10
Payer: COMMERCIAL

## 2023-02-10 VITALS
SYSTOLIC BLOOD PRESSURE: 140 MMHG | DIASTOLIC BLOOD PRESSURE: 80 MMHG | OXYGEN SATURATION: 98 % | WEIGHT: 174.4 LBS | BODY MASS INDEX: 30.9 KG/M2 | TEMPERATURE: 98.9 F | HEART RATE: 84 BPM | HEIGHT: 63 IN

## 2023-02-10 DIAGNOSIS — Z00.00 HEALTHCARE MAINTENANCE: Primary | ICD-10-CM

## 2023-02-10 DIAGNOSIS — F32.9 REACTIVE DEPRESSION: ICD-10-CM

## 2023-02-10 PROBLEM — R10.31 ABDOMINAL PAIN, RIGHT LOWER QUADRANT: Status: ACTIVE | Noted: 2022-05-15

## 2023-02-10 PROCEDURE — 99396 PREV VISIT EST AGE 40-64: CPT | Performed by: NURSE PRACTITIONER

## 2023-02-10 RX ORDER — LEVOCETIRIZINE DIHYDROCHLORIDE 5 MG/1
5 TABLET, FILM COATED ORAL DAILY
COMMUNITY
End: 2023-02-12 | Stop reason: SDUPTHER

## 2023-02-10 NOTE — PROGRESS NOTES
"Subjective   Jessica Colon is a 62 y.o. female and is here for a comprehensive physical exam. The patient reports no problems.    Do you take any herbs or supplements that were not prescribed by a doctor? vit D    Patient has had some neck pain. She did physical therapy which did help.     Patient is here to follow up on depression. She is doing well with pristiq.    She is going to be retiring this year (probably in May)     History:  Sees gyn at [facility] 4/28/2022    The following portions of the patient's history were reviewed and updated as appropriate: allergies, current medications, past family history, past medical history, past social history, past surgical history and problem list.    Review of Systems  Do you have pain that bothers you in your daily life? no  Pertinent items are noted in HPI.    Objective   /80   Pulse 84   Temp 98.9 °F (37.2 °C)   Ht 160 cm (62.99\")   Wt 79.1 kg (174 lb 6.4 oz)   SpO2 98%   BMI 30.90 kg/m²     General Appearance:    Alert, cooperative, no distress, appears stated age   Head:    Normocephalic, without obvious abnormality, atraumatic   Eyes:    PERRL, conjunctiva/corneas clear, EOM's intact, both eyes   Ears:    Normal TM's and external ear canals, both ears   Nose:   Nares normal, septum midline, mucosa normal, no drainage    or sinus tenderness   Throat:   Lips, mucosa, and tongue normal; teeth and gums normal   Neck:   Supple, symmetrical, trachea midline, no adenopathy;     thyroid:  no enlargement/tenderness/nodules; no carotid    bruit   Back:     Symmetric, no curvature, ROM normal, no CVA tenderness   Lungs:     Clear to auscultation bilaterally, respirations unlabored   Chest Wall:    No tenderness or deformity    Heart:    Regular rate and rhythm, S1 and S2 normal, no murmur       Abdomen:     Soft, non-tender, bowel sounds active all four quadrants,     no masses, no organomegaly           Extremities:   Extremities normal, atraumatic, no " cyanosis or edema   Pulses:   2+ and symmetric all extremities   Skin:   Skin color, texture, turgor normal, no rashes or lesions   Lymph nodes:   Cervical, supraclavicular, and axillary nodes normal   Neurologic:   Grossly intact, normal strength, sensation and reflexes     throughout           Assessment & Plan   Healthy female exam.      1. Diagnoses and all orders for this visit:    1. Healthcare maintenance (Primary)    2. Reactive depression    depression - continue pristiq. Patient doesn't need a refill at this time.     2. Patient Counseling:  --Nutrition: Stressed importance of moderation in sodium/caffeine intake, saturated fat and cholesterol, caloric balance, sufficient intake of fresh fruits, vegetables, fiber, calcium, iron.  --Exercise: Stressed the importance of regular exercise.   --Dental health: Discussed importance of regular tooth brushing, flossing, and dental visits.  --Immunizations reviewed.    3. Follow up for fasting labs.

## 2023-02-17 ENCOUNTER — TELEPHONE (OUTPATIENT)
Dept: INTERNAL MEDICINE | Facility: CLINIC | Age: 63
End: 2023-02-17

## 2023-02-17 NOTE — TELEPHONE ENCOUNTER
Caller: Jessica Colon    Relationship: Self    Best call back number: [phone]    What is the medical concern/diagnosis: PHYSICAL THERAPIST STATED SHE MAY HAVE A PINCHED NERVE IN HER NECK, CAUSING MIRGANES    What specialty or service is being requested: SPINE DOCTOR    What is the provider, practice or medical service name: [facility]    What is the office location: [address] [place] [address]    What is the office phone number: [phone]

## 2023-02-20 ENCOUNTER — TELEPHONE (OUTPATIENT)
Dept: INTERNAL MEDICINE | Facility: CLINIC | Age: 63
End: 2023-02-20
Payer: COMMERCIAL

## 2023-02-20 DIAGNOSIS — M54.2 NECK PAIN: Primary | ICD-10-CM

## 2023-02-23 ENCOUNTER — OFFICE VISIT (OUTPATIENT)
Dept: INTERNAL MEDICINE | Facility: CLINIC | Age: 63
End: 2023-02-23
Payer: COMMERCIAL

## 2023-02-23 VITALS
BODY MASS INDEX: 31.61 KG/M2 | HEIGHT: 63 IN | TEMPERATURE: 98.9 F | OXYGEN SATURATION: 98 % | SYSTOLIC BLOOD PRESSURE: 122 MMHG | WEIGHT: 178.4 LBS | DIASTOLIC BLOOD PRESSURE: 82 MMHG | HEART RATE: 90 BPM

## 2023-02-23 DIAGNOSIS — M54.2 CERVICAL PAIN: Primary | ICD-10-CM

## 2023-02-23 PROCEDURE — 99213 OFFICE O/P EST LOW 20 MIN: CPT | Performed by: NURSE PRACTITIONER

## 2023-02-23 NOTE — PROGRESS NOTES
Subjective   Jessica Colon is a 62 y.o. female. Patient is here today for   Chief Complaint   Patient presents with   • Neck Pain   .    History of Present Illness   C/o neck pain for at least 10 years. She was seen by Damián and had an xray in December 2022. She isn't sure what the x-ray showed. She was then in physical therapy for about 2 months. No pain into arms. The pain is in the back of her neck and occasionally goes up the right side and back of head causing a headache. Patient hasn't taken anything recently for the pain because OTC meds haven't helped. Heat helps some.     The following portions of the patient's history were reviewed and updated as appropriate: allergies, current medications, past family history, past medical history, past social history, past surgical history and problem list.    Review of Systems    Objective   Vitals:    02/23/23 1420   BP: 122/82   Pulse: 90   Temp: 98.9 °F (37.2 °C)   SpO2: 98%     Body mass index is 31.61 kg/m².  Physical Exam  Vitals and nursing note reviewed.   Constitutional:       Appearance: Normal appearance. She is well-developed.   Cardiovascular:      Rate and Rhythm: Normal rate and regular rhythm.      Heart sounds: Normal heart sounds.   Pulmonary:      Effort: Pulmonary effort is normal.      Breath sounds: Normal breath sounds.   Musculoskeletal:      Cervical back: Pain with movement, spinous process tenderness and muscular tenderness present. Decreased range of motion.   Skin:     General: Skin is warm and dry.   Neurological:      Mental Status: She is alert and oriented to person, place, and time.   Psychiatric:         Speech: Speech normal.         Behavior: Behavior normal.         Thought Content: Thought content normal.         Assessment & Plan   Diagnoses and all orders for this visit:    1. Cervical pain (Primary)  -     MRI Cervical Spine Without Contrast; Future      Since patient has had an x-ray and physical therapy and continues  with pain, will check an MRI. Patient would like to see Dr. Jose Antonio Mendoza, neurosurgery. Will wait for MRI results prior to placing referral

## 2023-03-01 RX ORDER — LAMOTRIGINE 100 MG/1
TABLET ORAL
Qty: 120 TABLET | Refills: 11 | Status: SHIPPED | OUTPATIENT
Start: 2023-03-01 | End: 2023-03-02 | Stop reason: SDUPTHER

## 2023-03-02 ENCOUNTER — OFFICE VISIT (OUTPATIENT)
Dept: NEUROLOGY | Facility: CLINIC | Age: 63
End: 2023-03-02
Payer: COMMERCIAL

## 2023-03-02 VITALS
DIASTOLIC BLOOD PRESSURE: 76 MMHG | WEIGHT: 176 LBS | OXYGEN SATURATION: 97 % | BODY MASS INDEX: 31.18 KG/M2 | SYSTOLIC BLOOD PRESSURE: 112 MMHG | HEIGHT: 63 IN | HEART RATE: 87 BPM

## 2023-03-02 DIAGNOSIS — G40.009 PARTIAL IDIOPATHIC EPILEPSY WITH SEIZURES OF LOCALIZED ONSET, NOT INTRACTABLE, WITHOUT STATUS EPILEPTICUS: Primary | ICD-10-CM

## 2023-03-02 PROCEDURE — 99212 OFFICE O/P EST SF 10 MIN: CPT | Performed by: NURSE PRACTITIONER

## 2023-03-02 RX ORDER — LAMOTRIGINE 100 MG/1
TABLET ORAL
Qty: 120 TABLET | Refills: 11 | Status: SHIPPED | OUTPATIENT
Start: 2023-03-02 | End: 2023-03-14 | Stop reason: SDUPTHER

## 2023-03-02 NOTE — PROGRESS NOTES
Subjective   Jessica Colon is a 62 y.o. female presenting for follow-up for epilepsy.  She is a previous patient of Dr. Celestin.  She had onset of partial complex seizures at age 10, idiopathic, without any timing or context issues.  She is on Lamictal 300 mg in the morning and 100 mg in the evening.  She has not had a seizure in over 20 years.  Her seizures are described as an odd sensation of flushing and warmth followed by reduced responsiveness and then postictal fatigue.    History of Present Illness     Review of Systems   Allergic/Immunologic: Negative for environmental allergies, food allergies and immunocompromised state.   Neurological: Negative for dizziness, tremors, seizures, syncope, facial asymmetry, speech difficulty, weakness, light-headedness, numbness, headache, memory problem and confusion.   Hematological: Negative for adenopathy. Does not bruise/bleed easily.   Psychiatric/Behavioral: Negative for behavioral problems, decreased concentration and sleep disturbance. The patient is not nervous/anxious.        I have reviewed and confirmed the accuracy of the patient's history: Chief complaint, HPI, ROS, Subjective and Past Family Social History as entered by the MA/LPN/RN. Gwendolyn Elliott MA 03/02/23      Objective     Physical Examination:  General Appearance:  Well developed, well nourished, well groomed, alert, and cooperative.  HEENT: Normocephalic.    Neck and Spine: Normal range of motion.  Normal alignment. No mass or tenderness. No bruits.  Cardiac: Regular rate and rhythm. No murmurs.  Peripheral Vasculature: Radial and pedal pulses are equal and symmetric.   Extremities: No edema or deformities. Normal joint ROM.  Skin: No rashes or birth marks.      Neurological examination:   Higher Integrative Function: Oriented to time, place, and person. Normal registration, recall attention span and concentration. Normal language including comprehension, spontaneous speech, repetition, reading,  writing, naming and vocabulary. No neglect with normal visual-spatial function and construction. Normal fund of knowledge and higher integrative function.   CN II: Pupils are equal, round and reactive to light. Normal visual acuity and visual fields.   CN III IV VI: Extraocular movements are full without nystagmus.   CN V: Normal facial sensation and strength of muscles of mastication.   CN VII: Facial movements are symmetric. No weakness.   CN VIII: Auditory acuity is normal.  CN IX & X: Symmetric palatal movement.   CN XI: Sternocleidomastoid and trapezius are normal. No weakness.   CN XII: The tongue is midline. No atrophy or fasciculations.  Motor: Normal muscle strength, bulk and tone in upper and lower extremities. No fasciculations, rigidity, spasticity, or abnormal movements.   Reflexes: 2+ in the upper and lower extremities. Plantar responses are flexor.   Sensation: Normal light touch, pinprick, vibration, temperature, and proprioception in the arms and legs.   Station and Gait: Normal gait and station.   Coordination: Finger to nose test shows no dysmetria. Rapid alternating movements are normal. Heel to shin is normal.           Assessment & Plan   Diagnoses and all orders for this visit:    1. Partial idiopathic epilepsy with seizures of localized onset, not intractable, without status epilepticus (HCC) (Primary)    Other orders  -     lamoTRIgine (LaMICtal) 100 MG tablet; TAKE THREE TABLETS BY MOUTH EVERY MORNING AND TAKE ONE TABLET BY MOUTH EVERY EVENING  Dispense: 120 tablet; Refill: 11       She is doing well.  No changes were made today.  Continue lamotrigine 300 mg in the morning and 100 mg in the evening.  Seizure precautions were discussed.  She will call with any problems.  Follow-up annually, sooner if needed.

## 2023-03-11 DIAGNOSIS — F32.A DEPRESSION, UNSPECIFIED DEPRESSION TYPE: ICD-10-CM

## 2023-03-13 RX ORDER — DESVENLAFAXINE 100 MG/1
TABLET, EXTENDED RELEASE ORAL
Qty: 30 TABLET | Refills: 3 | Status: SHIPPED | OUTPATIENT
Start: 2023-03-13 | End: 2023-03-17 | Stop reason: SDUPTHER

## 2023-03-14 DIAGNOSIS — F32.A DEPRESSION, UNSPECIFIED DEPRESSION TYPE: ICD-10-CM

## 2023-03-14 RX ORDER — DESVENLAFAXINE 100 MG/1
TABLET, EXTENDED RELEASE ORAL
Qty: 30 TABLET | Refills: 3 | OUTPATIENT
Start: 2023-03-14

## 2023-03-14 RX ORDER — LAMOTRIGINE 100 MG/1
TABLET ORAL
Qty: 120 TABLET | Refills: 11 | Status: SHIPPED | OUTPATIENT
Start: 2023-03-14 | End: 2023-03-15 | Stop reason: SDUPTHER

## 2023-03-14 NOTE — TELEPHONE ENCOUNTER
Caller: MUKESH  Relationship: PHARMACY TECH    Best call back number:[phone]    Requested Prescriptions:   Requested Prescriptions     Pending Prescriptions Disp Refills   • lamoTRIgine (LaMICtal) 100 MG tablet 120 tablet 11     Sig: TAKE THREE TABLETS BY MOUTH EVERY MORNING AND TAKE ONE TABLET BY MOUTH EVERY EVENING        Pharmacy where request should be sent:  [facility]    Additional details provided by patient: PHARMACY TECH CALLED AND NEEDING A REFILL FOR PT LAMICTAL. PHARMACY TECH WAS UNSURE HOW MANY PT HAD LEFT.    Does the patient have less than a 3 day supply:  [x] Yes  [] No

## 2023-03-14 NOTE — TELEPHONE ENCOUNTER
Caller: [facility], [place] - [address] [phone] [place] [phone] FX    Relationship: Pharmacy    Best call back number: [phone]    Requested Prescriptions:   Requested Prescriptions     Pending Prescriptions Disp Refills   • desvenlafaxine (PRISTIQ) 100 MG 24 hr tablet 30 tablet 3     Sig: TAKE ONE TABLET BY MOUTH DAILY        Pharmacy where request should be sent: [facility] [address] [phone] [place] [phone] FX     Additional details provided by patient:     WAS SENT TO THE WRONG PHARMACY .     Does the patient have less than a 3 day supply:  [] Yes  [x] No    Would you like a call back once the refill request has been completed: [x] Yes [] No    If the office needs to give you a call back, can they leave a voicemail: [x] Yes [] No    Sudha Hernandez Rep   03/14/23 11:50 EDT

## 2023-03-15 RX ORDER — LAMOTRIGINE 100 MG/1
TABLET ORAL
Qty: 360 TABLET | Refills: 3 | Status: SHIPPED | OUTPATIENT
Start: 2023-03-15

## 2023-03-17 DIAGNOSIS — F32.A DEPRESSION, UNSPECIFIED DEPRESSION TYPE: ICD-10-CM

## 2023-03-17 RX ORDER — DESVENLAFAXINE 100 MG/1
TABLET, EXTENDED RELEASE ORAL
Qty: 90 TABLET | Refills: 1 | Status: SHIPPED | OUTPATIENT
Start: 2023-03-17

## 2023-03-20 ENCOUNTER — HOSPITAL ENCOUNTER (OUTPATIENT)
Dept: GENERAL RADIOLOGY | Facility: HOSPITAL | Age: 63
Discharge: HOME | End: 2023-03-20
Payer: COMMERCIAL

## 2023-03-20 ENCOUNTER — HOSPITAL ENCOUNTER (OUTPATIENT)
Dept: MRI IMAGING | Facility: HOSPITAL | Age: 63
Discharge: HOME | End: 2023-03-20
Payer: COMMERCIAL

## 2023-03-20 DIAGNOSIS — M54.2 NECK PAIN: ICD-10-CM

## 2023-03-20 DIAGNOSIS — M54.2 CERVICAL PAIN: ICD-10-CM

## 2023-03-20 PROCEDURE — 72040 X-RAY EXAM NECK SPINE 2-3 VW: CPT

## 2023-03-20 PROCEDURE — 72141 MRI NECK SPINE W/O DYE: CPT

## 2023-03-21 DIAGNOSIS — M54.12 CERVICAL RADICULOPATHY: ICD-10-CM

## 2023-03-21 DIAGNOSIS — M54.2 CERVICAL PAIN: Primary | ICD-10-CM

## 2023-04-01 NOTE — PROGRESS NOTES
"Subjective   History of Present Illness: Jessica Colon is a 62 y.o. female is being seen for consultation today at the request of SHAILA Stanton for chronic neck pain.  She had an acute exacerbation while doing some activity at work in December and after 2-1/2 months of physical therapy states that she is back to her baseline neck soreness and pain.  Primarily the pain is on the left and does go under the left shoulder blade.  She denies arm pain, numbness, tingling and weakness.  She does occasionally get pain that goes up the back of the head consistent with occipital neuralgia but it is not persistent.    While in the room and during my examination of the patient I wore a mask.  I washed my hands before and after this patient encounter.  The patient was also wearing a mask.    History of Present Illness    Tobacco Use: Medium Risk   • Smoking Tobacco Use: Former   • Smokeless Tobacco Use: Never   • Passive Exposure: Not on file        The following portions of the patient's history were reviewed and updated as appropriate: allergies, current medications, past family history, past medical history, past social history, past surgical history and problem list.    Review of Systems    Objective     Vitals:    04/04/23 1322   BP: 140/92   Weight: 79.8 kg (176 lb)   Height: 160 cm (62.99\")     Body mass index is 31.19 kg/m².      Physical Exam  Neurologic Exam    Physical Exam:    CONSTITUTIONAL: This 62 year old  female appears well developed, well-nourished and in no acute distress.    HEAD & FACE: the head and face are symmetric, normocephalic and atraumatic.    EYES: Inspection of the conjunctivae and lids reveals no swelling, erythema or discharge.  Pupils are round, equal and reactive to light and there is no scleral icterus.    EARS, NOSE, MOUTH & THROAT: On inspection, the ears and nose are within normal limits.    NECK: the neck is supple and symmetric. The trachea is midline with no masses.  " Range of the motion of the neck is reasonable although she has a little limitation with extension.    PULMONARY: Respiratory effort is normal with no increased work of breathing or signs of respiratory distress.    CARDIOVASCULAR: Pedal pulses are +1/4 bilaterally. Examination of the extremities shows no edema or varicosities.    MUSCULOSKELETAL: Gait and station are within normal limits. The spine has normal alignment and range of motion.    SKIN: The skin is warm, dry and intact    NEUROLOGIC:   Cranial Nerves 2-12 intact  Normal motor strength noted. Muscle bulk and tone are normal.  Sensory exam is normal to fine touch to confrontational testing bilaterally  Reflexes on the right side demonstrates 1/4 Triceps Reflex, 2/4 Biceps Reflex, 2/4 Brachioradialis Reflex, 2/4 Knee Jerk Reflex, 2/4 Ankle Jerk Reflex and no ankle clonus on the right.   Reflexes on the left side demonstrates 1/4 Triceps Reflex, 2/4 Biceps Reflex, 2/4 Brachioradialis Reflex, 2/4 Knee Jerk Reflex, 2/4 Ankle Jerk Reflex and no ankle clonus on the left.  Superficial/Primitive Reflexes: primitive reflexes were absent.  Olivas's, Babinski, and Clonus signs all negative.  No coordination deficit observed.  Radicular testing showed a negative Spurling's maneuver  Cortical function is intact and without deficits. Speech is normal.    PSYCHIATRIC: oriented to person, place and time. Patient's mood and affect are normal.    Assessment & Plan   Independent Review of Radiographic Studies:      I personally reviewed the images from the following studies.    Cervical x-rays done at [facility] on March 20, 2023 reveal no acute findings with degenerative change noted throughout the cervical spine.    MRI of the cervical spine done without contrast on March 20, 2023 at [facility] reveals diffuse spondylosis throughout the cervical spine most significant degenerative change contributes to moderate narrowing of the central canal at C4-C5 without cord  compression.  There is mild to moderate neuroforaminal narrowing throughout most of the cervical spine from C2-T1.    Medical Decision Making:      From the neurosurgical perspective there is no surgical option for isolated neck pain. There is no persistent radiculopathy or loss of nerve function on exam to justify surgery. Generally most people respond favorably to physical therapy which is helped her from her recent exacerbation considerably. In difficult situations such as her aspect of chronic pain, interventional pain management such as cervical epidural steroids or facet rhizotomies are an option.     Since surgical pathology was not identified radiographically or clinically, we will refer her to a pain management specialist and only plan on follow up as needed if surgical questions arise.    No follow-ups on file.    Diagnoses and all orders for this visit:    1. Chronic neck pain (Primary)  -     Ambulatory Referral to Pain Management Clinic    2. Cervical spondylosis without myelopathy  -     Ambulatory Referral to Pain Management Clinic             Jose Antonio Mendoza MD FACS [facility]  Neurological Surgery

## 2023-04-04 ENCOUNTER — OFFICE VISIT (OUTPATIENT)
Dept: NEUROSURGERY | Facility: CLINIC | Age: 63
End: 2023-04-04
Payer: COMMERCIAL

## 2023-04-04 VITALS
HEIGHT: 63 IN | SYSTOLIC BLOOD PRESSURE: 140 MMHG | WEIGHT: 176 LBS | DIASTOLIC BLOOD PRESSURE: 92 MMHG | BODY MASS INDEX: 31.18 KG/M2

## 2023-04-04 DIAGNOSIS — M47.812 CERVICAL SPONDYLOSIS WITHOUT MYELOPATHY: ICD-10-CM

## 2023-04-04 DIAGNOSIS — G89.29 CHRONIC NECK PAIN: Primary | ICD-10-CM

## 2023-04-04 DIAGNOSIS — M54.2 CHRONIC NECK PAIN: Primary | ICD-10-CM

## 2023-04-04 PROCEDURE — 99204 OFFICE O/P NEW MOD 45 MIN: CPT | Performed by: NEUROLOGICAL SURGERY

## 2023-04-07 ENCOUNTER — PATIENT ROUNDING (BHMG ONLY) (OUTPATIENT)
Dept: NEUROSURGERY | Facility: CLINIC | Age: 63
End: 2023-04-07
Payer: COMMERCIAL

## 2023-04-16 DIAGNOSIS — F32.A DEPRESSION, UNSPECIFIED DEPRESSION TYPE: ICD-10-CM

## 2023-04-17 RX ORDER — DESVENLAFAXINE 100 MG/1
TABLET, EXTENDED RELEASE ORAL
Qty: 30 TABLET | Refills: 0 | Status: SHIPPED | OUTPATIENT
Start: 2023-04-17

## 2023-04-25 ENCOUNTER — TELEPHONE (OUTPATIENT)
Dept: NEUROSURGERY | Facility: CLINIC | Age: 63
End: 2023-04-25
Payer: COMMERCIAL

## 2023-04-25 NOTE — TELEPHONE ENCOUNTER
REASON FOR SENDING: SO PROVIDER IS AWARE OF NOTES IN PATIENTS CHART    DOCUMENT DESCRIPTION: PROGRESS NOTE 4/20/23    NAME OF SENDER: [facility] [place]    DATE INDEXED: 4/25/23

## 2023-05-06 DIAGNOSIS — F32.A DEPRESSION, UNSPECIFIED DEPRESSION TYPE: ICD-10-CM

## 2023-05-08 RX ORDER — DESVENLAFAXINE 100 MG/1
TABLET, EXTENDED RELEASE ORAL
Qty: 30 TABLET | Refills: 0 | Status: SHIPPED | OUTPATIENT
Start: 2023-05-08

## 2023-06-15 ENCOUNTER — HOSPITAL ENCOUNTER (OUTPATIENT)
Dept: CARDIOLOGY | Facility: HOSPITAL | Age: 63
Discharge: HOME | End: 2023-06-15
Payer: MEDICARE

## 2023-06-15 ENCOUNTER — OFFICE VISIT (OUTPATIENT)
Dept: INTERNAL MEDICINE | Facility: CLINIC | Age: 63
End: 2023-06-15
Payer: MEDICARE

## 2023-06-15 VITALS
SYSTOLIC BLOOD PRESSURE: 126 MMHG | HEART RATE: 75 BPM | OXYGEN SATURATION: 98 % | HEIGHT: 63 IN | WEIGHT: 174.2 LBS | BODY MASS INDEX: 30.87 KG/M2 | DIASTOLIC BLOOD PRESSURE: 84 MMHG | TEMPERATURE: 97.5 F

## 2023-06-15 DIAGNOSIS — M79.605 PAIN AND SWELLING OF LEFT LOWER EXTREMITY: ICD-10-CM

## 2023-06-15 DIAGNOSIS — M79.662 PAIN OF LEFT CALF: Primary | ICD-10-CM

## 2023-06-15 DIAGNOSIS — M79.89 PAIN AND SWELLING OF LEFT LOWER EXTREMITY: ICD-10-CM

## 2023-06-15 LAB
BH CV LOWER VASCULAR LEFT COMMON FEMORAL AUGMENT: NORMAL
BH CV LOWER VASCULAR LEFT COMMON FEMORAL COMPETENT: NORMAL
BH CV LOWER VASCULAR LEFT COMMON FEMORAL COMPRESS: NORMAL
BH CV LOWER VASCULAR LEFT COMMON FEMORAL PHASIC: NORMAL
BH CV LOWER VASCULAR LEFT COMMON FEMORAL SPONT: NORMAL
BH CV LOWER VASCULAR LEFT DISTAL FEMORAL COMPRESS: NORMAL
BH CV LOWER VASCULAR LEFT GASTRONEMIUS COMPRESS: NORMAL
BH CV LOWER VASCULAR LEFT GREATER SAPH AK COMPRESS: NORMAL
BH CV LOWER VASCULAR LEFT GREATER SAPH BK COMPRESS: NORMAL
BH CV LOWER VASCULAR LEFT LESSER SAPH COMPRESS: NORMAL
BH CV LOWER VASCULAR LEFT MID FEMORAL AUGMENT: NORMAL
BH CV LOWER VASCULAR LEFT MID FEMORAL COMPETENT: NORMAL
BH CV LOWER VASCULAR LEFT MID FEMORAL COMPRESS: NORMAL
BH CV LOWER VASCULAR LEFT MID FEMORAL PHASIC: NORMAL
BH CV LOWER VASCULAR LEFT MID FEMORAL SPONT: NORMAL
BH CV LOWER VASCULAR LEFT PERONEAL COMPRESS: NORMAL
BH CV LOWER VASCULAR LEFT POPLITEAL AUGMENT: NORMAL
BH CV LOWER VASCULAR LEFT POPLITEAL COMPETENT: NORMAL
BH CV LOWER VASCULAR LEFT POPLITEAL COMPRESS: NORMAL
BH CV LOWER VASCULAR LEFT POPLITEAL PHASIC: NORMAL
BH CV LOWER VASCULAR LEFT POPLITEAL SPONT: NORMAL
BH CV LOWER VASCULAR LEFT POSTERIOR TIBIAL COMPRESS: NORMAL
BH CV LOWER VASCULAR LEFT PROFUNDA FEMORAL COMPRESS: NORMAL
BH CV LOWER VASCULAR LEFT PROXIMAL FEMORAL COMPRESS: NORMAL
BH CV LOWER VASCULAR LEFT SAPHENOFEMORAL JUNCTION COMPRESS: NORMAL
BH CV LOWER VASCULAR RIGHT COMMON FEMORAL AUGMENT: NORMAL
BH CV LOWER VASCULAR RIGHT COMMON FEMORAL COMPETENT: NORMAL
BH CV LOWER VASCULAR RIGHT COMMON FEMORAL COMPRESS: NORMAL
BH CV LOWER VASCULAR RIGHT COMMON FEMORAL PHASIC: NORMAL
BH CV LOWER VASCULAR RIGHT COMMON FEMORAL SPONT: NORMAL
BH CV VAS PRELIMINARY FINDINGS SCRIPTING: 1

## 2023-06-15 PROCEDURE — 93971 EXTREMITY STUDY: CPT

## 2023-06-15 PROCEDURE — 99213 OFFICE O/P EST LOW 20 MIN: CPT | Performed by: NURSE PRACTITIONER

## 2023-06-15 NOTE — PROGRESS NOTES
Subjective   Jessica Colon is a 63 y.o. female.     Chief Complaint   Patient presents with    Leg Swelling     Left calf, swollen and painful - bruised on back of calf - pain started about 6 days ago, didn't notice bruising until 1-2 days ago        History of Present Illness   She is here today with c/o acute left calf pain and swelling. Symptoms started approximately 6 days ago. She notes that her left calf has felt hard.  She has now developed discoloration to her left shin and the left calf.  Her left calf is significantly larger than her right.  She has noticed bilateral leg aching over the past 5-6 months. She has stayed very active at work with a lot of walking.  She recently retired at the beginning of June.  She has been wearing compression socks for the past several months with some improvement in symptoms.  No history of DVT.  She denies any recent travel on an airplane or long car ride.  She denies any shortness of breath, palpitations or chest pain.  No family history of blood clots.    The following portions of the patient's history were reviewed and updated as appropriate: allergies, current medications, past family history, past medical history, past social history, past surgical history, and problem list.    Review of Systems   Constitutional: Negative.    Respiratory: Negative.     Cardiovascular:  Positive for leg swelling. Negative for chest pain and palpitations.   Musculoskeletal:  Positive for myalgias.     Objective   Physical Exam  Constitutional:       Appearance: She is well-developed.   Neck:      Thyroid: No thyroid mass, thyromegaly or thyroid tenderness.      Vascular: No carotid bruit.      Trachea: Trachea normal.   Cardiovascular:      Rate and Rhythm: Normal rate and regular rhythm.      Chest Wall: PMI is not displaced.      Pulses:           Radial pulses are 2+ on the right side and 2+ on the left side.        Dorsalis pedis pulses are 2+ on the right side and 2+ on the left  side.        Posterior tibial pulses are 2+ on the right side and 2+ on the left side.      Heart sounds: S1 normal and S2 normal.      Comments: Positive Homans' sign left lower extremity.  Left calf measures 49 cm, right calf 45 cm.  Varicose veins present bilateral lower extremities.  Pulmonary:      Effort: Pulmonary effort is normal.      Breath sounds: Normal breath sounds.   Musculoskeletal:      Right lower leg: No edema.      Left lower leg: Edema present.   Lymphadenopathy:      Head:      Right side of head: No submental, submandibular, tonsillar or occipital adenopathy.      Left side of head: No submental, submandibular, tonsillar or occipital adenopathy.      Cervical: No cervical adenopathy.   Skin:     General: Skin is warm and dry.      Capillary Refill: Capillary refill takes less than 2 seconds.      Nails: There is no clubbing.             Comments: Brown discoloration to left shin.  Left calf with mottling and purple discoloration.   Neurological:      Mental Status: She is alert and oriented to person, place, and time.   Psychiatric:         Attention and Perception: Attention normal.         Mood and Affect: Mood and affect normal.         Speech: Speech normal.         Behavior: Behavior normal.         Thought Content: Thought content normal.         Cognition and Memory: Cognition normal.       Vitals:    06/15/23 1132   BP: 126/84   Pulse: 75   Temp: 97.5 °F (36.4 °C)   SpO2: 98%      Body mass index is 30.87 kg/m².    Assessment & Plan   Problems Addressed this Visit    None  Visit Diagnoses       Pain of left calf    -  Primary    Relevant Orders    Duplex Venous Lower Extremity - Left CAR    Pain and swelling of left lower extremity        Relevant Orders    Duplex Venous Lower Extremity - Left CAR          Diagnoses         Codes Comments    Pain of left calf    -  Primary ICD-10-CM: M79.662  ICD-9-CM: 729.5     Pain and swelling of left lower extremity     ICD-10-CM: M79.605,  M79.89  ICD-9-CM: 729.5, 729.81           1.  Pain and swelling of left calf-stat venous duplex left lower extremity ordered today for further evaluation for acute DVT.  If ultrasound is positive will initiate anticoag and place referral to hematology.  Discussed with her that she should be seen emergently in the ER for any worsening symptoms, shortness of breath, chest pain or palpitations.

## 2023-06-16 DIAGNOSIS — F32.A DEPRESSION, UNSPECIFIED DEPRESSION TYPE: ICD-10-CM

## 2023-06-19 RX ORDER — DESVENLAFAXINE 100 MG/1
TABLET, EXTENDED RELEASE ORAL
Qty: 90 TABLET | Refills: 1 | Status: SHIPPED | OUTPATIENT
Start: 2023-06-19

## 2023-08-04 ENCOUNTER — OFFICE VISIT (OUTPATIENT)
Dept: INTERNAL MEDICINE | Facility: CLINIC | Age: 63
End: 2023-08-04
Payer: MEDICARE

## 2023-08-04 VITALS
HEART RATE: 92 BPM | BODY MASS INDEX: 31.47 KG/M2 | WEIGHT: 177.6 LBS | HEIGHT: 63 IN | OXYGEN SATURATION: 98 % | TEMPERATURE: 97.8 F | SYSTOLIC BLOOD PRESSURE: 120 MMHG | DIASTOLIC BLOOD PRESSURE: 80 MMHG

## 2023-08-04 DIAGNOSIS — F32.A DEPRESSION, UNSPECIFIED DEPRESSION TYPE: ICD-10-CM

## 2023-08-04 DIAGNOSIS — Z00.00 WELCOME TO MEDICARE PREVENTIVE VISIT: Primary | ICD-10-CM

## 2023-08-04 NOTE — PROGRESS NOTES
The ABCs of the Annual Wellness Visit  [place] to Medicare Visit    Subjective     Jessica Colon is a 63 y.o. female who presents for a  Welcome to Medicare Visit.    The following portions of the patient's history were reviewed and   updated as appropriate: allergies, current medications, past family history, past medical history, past social history, past surgical history, and problem list.     Compared to one year ago, the patient feels her physical   health is better.    Compared to one year ago, the patient feels her mental   health is worse. Sister     Recent Hospitalizations:  She was not admitted to the hospital during the last year.       Current Medical Providers:  Patient Care Team:  Rolanda Block APRN as PCP - General (Family Medicine)    Outpatient Medications Prior to Visit   Medication Sig Dispense Refill    desvenlafaxine (PRISTIQ) 100 MG 24 hr tablet TAKE ONE TABLET BY MOUTH DAILY **MUST CALL MD FOR APPOINTMENT 90 tablet 1    fluticasone (FLONASE) 50 MCG/ACT nasal spray 2 sprays into the nostril(s) as directed by provider As Needed.      hydrOXYzine (ATARAX) 10 MG tablet TAKE ONE TABLET BY MOUTH THREE TIMES A DAY AS NEEDED FOR ANXIETY 60 tablet 1    lamoTRIgine (LaMICtal) 100 MG tablet TAKE THREE TABLETS BY MOUTH EVERY MORNING AND TAKE ONE TABLET BY MOUTH EVERY EVENING 360 tablet 3    levocetirizine (XYZAL) 5 MG tablet Take 1 tablet by mouth Every Evening.      XIIDRA 5 % solution 2 (Two) Times a Day. 1 drop in each eye twice daily       No facility-administered medications prior to visit.       No opioid medication identified on active medication list. I have reviewed chart for other potential  high risk medication/s and harmful drug interactions in the elderly.        Aspirin is not on active medication list.  Aspirin use is not indicated based on review of current medical condition/s. Risk of harm outweighs potential benefits.  .    Patient Active Problem List   Diagnosis    Partial idiopathic  "epilepsy with seizures of localized onset, not intractable, without status epilepticus    Reactive depression    Cognitive disorder    Adjustment insomnia    Chronic left-sided low back pain without sciatica    Abdominal pain, right lower quadrant    Chronic neck pain    Cervical spondylosis without myelopathy     Advance Care Planning   Advance Care Planning     Advance Directive is not on file.  ACP discussion was held with the patient during this visit. Patient does not have an advance directive, declines further assistance.       Objective   Vitals:    23 0847   BP: 120/80   Pulse: 92   Temp: 97.8 øF (36.6 øC)   SpO2: 98%   Weight: 80.6 kg (177 lb 9.6 oz)   Height: 160 cm (62.99\")   PainSc:   4     Estimated body mass index is 31.47 kg/mý as calculated from the following:    Height as of this encounter: 160 cm (62.99\").    Weight as of this encounter: 80.6 kg (177 lb 9.6 oz).    BMI is >= 30 and <35. (Class 1 Obesity). The following options were offered after discussion;: exercise counseling/recommendations and nutrition counseling/recommendations      Does the patient have evidence of cognitive impairment?   No    Lab Results   Component Value Date    CHLPL 262 (H) 2023    TRIG 82 2023     2023     (H) 2023    VLDL 13 2023    HGBA1C 5.0 2023       Procedures       HEALTH RISK ASSESSMENT    Smoking Status:  Social History     Tobacco Use   Smoking Status Former    Packs/day: 1.50    Years: 15.00    Pack years: 22.50    Types: Cigarettes    Quit date: 2000    Years since quittin.5   Smokeless Tobacco Never     Alcohol Consumption:  Social History     Substance and Sexual Activity   Alcohol Use Yes    Alcohol/week: 2.0 standard drinks    Types: 2 Glasses of wine per week       Fall Risk Screen:    DAMIAN Fall Risk Assessment was completed, and patient is at LOW risk for falls.Assessment completed on:6/15/2023    Depression Screen:       2023     " 8:45 AM   PHQ-2/PHQ-9 Depression Screening   Little Interest or Pleasure in Doing Things 0-->not at all   Feeling Down, Depressed or Hopeless 1-->several days   PHQ-9: Brief Depression Severity Measure Score 1       Health Habits and Functional and Cognitive Screenin/4/2023     8:00 AM   Functional & Cognitive Status   Do you have difficulty preparing food and eating? No   Do you have difficulty bathing yourself, getting dressed or grooming yourself? No   Do you have difficulty using the toilet? No   Do you have difficulty moving around from place to place? No   Do you have trouble with steps or getting out of a bed or a chair? No   Current Diet Well Balanced Diet   Dental Exam Up to date   Eye Exam Up to date   Exercise (times per week) 0 times per week   Current Exercises Include No Regular Exercise   Do you need help using the phone?  No   Are you deaf or do you have serious difficulty hearing?  No   Do you need help to go to places out of walking distance? No   Do you need help shopping? No   Do you need help preparing meals?  No   Do you need help with housework?  No   Do you need help with laundry? No   Do you need help taking your medications? No   Do you need help managing money? No   Do you ever drive or ride in a car without wearing a seat belt? No   Have you felt unusual stress, anger or loneliness in the last month? No   Who do you live with? Spouse   If you need help, do you have trouble finding someone available to you? No   Have you been bothered in the last four weeks by sexual problems? No   Do you have difficulty concentrating, remembering or making decisions? No       Visual Acuity:    Vision Screening    Right eye Left eye Both eyes   Without correction      With correction 20/15 20/15 20/13       Age-appropriate Screening Schedule:  Refer to the list below for future screening recommendations based on patient's age, sex and/or medical conditions. Orders for these recommended tests are  "listed in the plan section. The patient has been provided with a written plan.    Health Maintenance   Topic Date Due    COVID-19 Vaccine (3 - Pfizer series) 06/12/2021    INFLUENZA VACCINE  10/01/2023    LIPID PANEL  07/21/2024    ANNUAL WELLNESS VISIT  08/04/2024    PAP SMEAR  04/28/2025    MAMMOGRAM  07/13/2025    DXA SCAN  07/13/2025    COLORECTAL CANCER SCREENING  10/31/2027    TDAP/TD VACCINES (3 - Td or Tdap) 04/28/2032    HEPATITIS C SCREENING  Completed    Pneumococcal Vaccine 65+  Completed    ZOSTER VACCINE  Completed        CMS Preventative Services Quick Reference  Risk Factors Identified During Encounter    None Identified  The above risks/problems have been discussed with the patient.  Pertinent information has been shared with the patient in the After Visit Summary.    Follow Up:   Initial Medicare Visit in one year    An After Visit Summary and PPPS were made available to the patient.      Additional E&M Note during same encounter follows:  Patient has multiple medical problems which are significant and separately identifiable that require additional work above and beyond the Medicare Wellness Visit.      Chief Complaint  Muscle Pain and Welcome To Medicare    Subjective        HPI  Jessica Colon is also being seen today for depression and anxiety. She is doing okay with pristiq. She take hydroxyzine as needed. She does still have stress in her life.     Her recent pap was abnormal and was HPV+. She has to reschedule her appointment.          Objective   Vital Signs:  /80   Pulse 92   Temp 97.8 øF (36.6 øC)   Ht 160 cm (62.99\")   Wt 80.6 kg (177 lb 9.6 oz)   SpO2 98%   BMI 31.47 kg/mý     Physical Exam  Vitals and nursing note reviewed.   Constitutional:       Appearance: Normal appearance. She is well-developed.   Cardiovascular:      Rate and Rhythm: Normal rate and regular rhythm.      Heart sounds: Normal heart sounds.   Pulmonary:      Effort: Pulmonary effort is normal.      " Breath sounds: Normal breath sounds.   Skin:     General: Skin is warm and dry.   Neurological:      Mental Status: She is alert and oriented to person, place, and time.   Psychiatric:         Speech: Speech normal.         Behavior: Behavior normal.         Thought Content: Thought content normal.        The following data was reviewed by: SHAILA Stanton on 08/04/2023:  Common labs          7/21/2023    09:14   Common Labs   Glucose 103    BUN 12    Creatinine 0.94    Sodium 142    Potassium 4.7    Chloride 101    Calcium 9.4    Total Cholesterol 262    Triglycerides 82    HDL Cholesterol 102    LDL Cholesterol  147    Hemoglobin A1C 5.0              Assessment and Plan   Diagnoses and all orders for this visit:    1. Welcome to Medicare preventive visit (Primary)    2. Depression, unspecified depression type    Other orders  -     Pneumococcal Conjugate Vaccine 20-Valent (PCV20)             Follow Up   No follow-ups on file.  Patient was given instructions and counseling regarding her condition or for health maintenance advice. Please see specific information pulled into the AVS if appropriate.

## 2023-11-06 DIAGNOSIS — F32.A DEPRESSION, UNSPECIFIED DEPRESSION TYPE: ICD-10-CM

## 2023-11-06 RX ORDER — DESVENLAFAXINE 100 MG/1
TABLET, EXTENDED RELEASE ORAL
Qty: 90 TABLET | Refills: 1 | Status: SHIPPED | OUTPATIENT
Start: 2023-11-06

## 2024-03-05 ENCOUNTER — OFFICE VISIT (OUTPATIENT)
Dept: NEUROLOGY | Facility: CLINIC | Age: 64
End: 2024-03-05
Payer: MEDICARE

## 2024-03-05 VITALS
SYSTOLIC BLOOD PRESSURE: 106 MMHG | DIASTOLIC BLOOD PRESSURE: 64 MMHG | OXYGEN SATURATION: 97 % | HEIGHT: 63 IN | HEART RATE: 87 BPM | BODY MASS INDEX: 30.3 KG/M2 | WEIGHT: 171 LBS

## 2024-03-05 DIAGNOSIS — G40.009 PARTIAL IDIOPATHIC EPILEPSY WITH SEIZURES OF LOCALIZED ONSET, NOT INTRACTABLE, WITHOUT STATUS EPILEPTICUS: Primary | ICD-10-CM

## 2024-03-05 PROCEDURE — 99213 OFFICE O/P EST LOW 20 MIN: CPT | Performed by: NURSE PRACTITIONER

## 2024-03-05 PROCEDURE — 1159F MED LIST DOCD IN RCRD: CPT | Performed by: NURSE PRACTITIONER

## 2024-03-05 PROCEDURE — 1160F RVW MEDS BY RX/DR IN RCRD: CPT | Performed by: NURSE PRACTITIONER

## 2024-03-05 RX ORDER — LAMOTRIGINE 100 MG/1
TABLET ORAL
Qty: 360 TABLET | Refills: 3 | Status: SHIPPED | OUTPATIENT
Start: 2024-03-05

## 2024-03-05 NOTE — PROGRESS NOTES
Subjective   Jessica Colon is a 63 y.o. female presenting for follow up for epilepsy. She is a previous patient of Dr. Celestin. She had onset of partial complex seizures at age 10, idiopathic, without any timing or context issues. She is on Lamictal 300 mg in the morning and 100 mg in the evening. She has not had a seizure in over 20 years. Her seizures are described as an odd sensation of flushing and warmth followed by reduced responsiveness and postictal fatigue.     She has been doing well. Planning a trip to [place], alone, this August. 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      Seizures   Pertinent negatives include no confusion and no speech difficulty.      Review of Systems   Neurological:  Negative for dizziness, tremors, seizures, syncope, facial asymmetry, speech difficulty, weakness, light-headedness, numbness, headache, memory problem and confusion.     I have reviewed and confirmed the accuracy of the patient's history: Chief complaint, HPI, ROS, Subjective, and Past Family Social History as entered by the MA/LPN/RN. Gwendolyn Elliott MA 03/05/24      Objective     Physical Examination:  General Appearance:  Well developed, well nourished, well groomed, alert, and cooperative.  HEENT: Normocephalic.    Neck and Spine: Normal range of motion.  Normal alignment. No mass or tenderness. No bruits.  Cardiac: Regular rate and rhythm. No murmurs.  Peripheral Vasculature: Radial and pedal pulses are equal and symmetric.   Extremities: No edema or deformities. Normal joint ROM.  Skin: No rashes or birth marks.      Neurological examination:   Higher Integrative Function: Oriented to time, place, and person. Normal registration, recall attention span and concentration. Normal language including comprehension, spontaneous speech, repetition, reading, writing, naming and vocabulary. No neglect with normal visual-spatial function and construction. Normal fund of knowledge and higher integrative function.   CN II:  Pupils are equal, round and reactive to light. Normal visual acuity and visual fields.   CN III IV VI: Extraocular movements are full without nystagmus.   CN V: Normal facial sensation and strength of muscles of mastication.   CN VII: Facial movements are symmetric. No weakness.   CN VIII: Auditory acuity is normal.  CN IX & X: Symmetric palatal movement.   CN XI: Sternocleidomastoid and trapezius are normal. No weakness.   CN XII: The tongue is midline. No atrophy or fasciculations.  Motor: Normal muscle strength, bulk and tone in upper and lower extremities. No fasciculations, rigidity, spasticity, or abnormal movements.   Reflexes: 2+ in the upper and lower extremities. Plantar responses are flexor.   Sensation: Normal light touch, pinprick, vibration, temperature, and proprioception in the arms and legs.   Station and Gait: Normal gait and station.   Coordination: Finger to nose test shows no dysmetria. Rapid alternating movements are normal. Heel to shin is normal.           Assessment & Plan   Diagnoses and all orders for this visit:    1. Partial idiopathic epilepsy with seizures of localized onset, not intractable, without status epilepticus (Primary)    Other orders  -     lamoTRIgine (LaMICtal) 100 MG tablet; TAKE THREE TABLETS BY MOUTH EVERY MORNING AND TAKE ONE TABLET BY MOUTH EVERY EVENING  Dispense: 360 tablet; Refill: 3    Doing well. Denies any seizures in over 20 years. Remains on Lamictal 300 mg in the morning and 100 mg in the evening. Seizure precautions were discussed. She will call with any problems. Follow up annually, sooner if needed.

## 2024-03-05 NOTE — LETTER
March 5, 2024       No Recipients    Patient: Jessica Colon   YOB: 1960   Date of Visit: 3/5/2024     Dear SHAILA Stanton:       Thank you for referring Jessica Colon to me for evaluation. Below are the relevant portions of my assessment and plan of care.    If you have questions, please do not hesitate to call me. I look forward to following Jessica along with you.         Sincerely,        SHAILA Garcia        CC:   No Recipients    Rudy Massey APRN  03/05/24 1253  Sign when Signing Visit  Subjective   Jessica Colon is a 63 y.o. female presenting for follow up for epilepsy. She is a previous patient of Dr. Celestin. She had onset of partial complex seizures at age 10, idiopathic, without any timing or context issues. She is on Lamictal 300 mg in the morning and 100 mg in the evening. She has not had a seizure in over 20 years. Her seizures are described as an odd sensation of flushing and warmth followed by reduced responsiveness and postictal fatigue.     She has been doing well. Planning a trip to [place], alone, this August. 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      Seizures   Pertinent negatives include no confusion and no speech difficulty.      Review of Systems   Neurological:  Negative for dizziness, tremors, seizures, syncope, facial asymmetry, speech difficulty, weakness, light-headedness, numbness, headache, memory problem and confusion.     I have reviewed and confirmed the accuracy of the patient's history: Chief complaint, HPI, ROS, Subjective, and Past Family Social History as entered by the MA/RAHEEM/RN. Gwendolyn Elliott MA 03/05/24      Objective     Physical Examination:  General Appearance:  Well developed, well nourished, well groomed, alert, and cooperative.  HEENT: Normocephalic.    Neck and Spine: Normal range of motion.  Normal alignment. No mass or tenderness. No bruits.  Cardiac: Regular rate and rhythm. No murmurs.  Peripheral Vasculature: Radial  and pedal pulses are equal and symmetric.   Extremities: No edema or deformities. Normal joint ROM.  Skin: No rashes or birth marks.      Neurological examination:   Higher Integrative Function: Oriented to time, place, and person. Normal registration, recall attention span and concentration. Normal language including comprehension, spontaneous speech, repetition, reading, writing, naming and vocabulary. No neglect with normal visual-spatial function and construction. Normal fund of knowledge and higher integrative function.   CN II: Pupils are equal, round and reactive to light. Normal visual acuity and visual fields.   CN III IV VI: Extraocular movements are full without nystagmus.   CN V: Normal facial sensation and strength of muscles of mastication.   CN VII: Facial movements are symmetric. No weakness.   CN VIII: Auditory acuity is normal.  CN IX & X: Symmetric palatal movement.   CN XI: Sternocleidomastoid and trapezius are normal. No weakness.   CN XII: The tongue is midline. No atrophy or fasciculations.  Motor: Normal muscle strength, bulk and tone in upper and lower extremities. No fasciculations, rigidity, spasticity, or abnormal movements.   Reflexes: 2+ in the upper and lower extremities. Plantar responses are flexor.   Sensation: Normal light touch, pinprick, vibration, temperature, and proprioception in the arms and legs.   Station and Gait: Normal gait and station.   Coordination: Finger to nose test shows no dysmetria. Rapid alternating movements are normal. Heel to shin is normal.           Assessment & Plan   Diagnoses and all orders for this visit:    1. Partial idiopathic epilepsy with seizures of localized onset, not intractable, without status epilepticus (Primary)    Other orders  -     lamoTRIgine (LaMICtal) 100 MG tablet; TAKE THREE TABLETS BY MOUTH EVERY MORNING AND TAKE ONE TABLET BY MOUTH EVERY EVENING  Dispense: 360 tablet; Refill: 3    Doing well. Denies any seizures in over 20 years.  Remains on Lamictal 300 mg in the morning and 100 mg in the evening. Seizure precautions were discussed. She will call with any problems. Follow up annually, sooner if needed.

## 2024-03-25 ENCOUNTER — TELEPHONE (OUTPATIENT)
Dept: INTERNAL MEDICINE | Facility: CLINIC | Age: 64
End: 2024-03-25
Payer: COMMERCIAL

## 2024-03-25 NOTE — TELEPHONE ENCOUNTER
Danette with [facility] called stating Rolanda Block is not Medicare eligible and will need an MD put a replacement order in for patient.  Please call Danette with [facility] at [phone]

## 2025-03-04 ENCOUNTER — OFFICE VISIT (OUTPATIENT)
Dept: NEUROLOGY | Facility: CLINIC | Age: 65
End: 2025-03-04
Payer: MEDICARE

## 2025-03-04 VITALS
WEIGHT: 174 LBS | HEART RATE: 81 BPM | BODY MASS INDEX: 30.83 KG/M2 | SYSTOLIC BLOOD PRESSURE: 104 MMHG | HEIGHT: 63 IN | OXYGEN SATURATION: 98 % | DIASTOLIC BLOOD PRESSURE: 66 MMHG

## 2025-03-04 DIAGNOSIS — R41.0 CONFUSION: Primary | ICD-10-CM

## 2025-03-04 DIAGNOSIS — G47.9 SLEEP DISTURBANCE: ICD-10-CM

## 2025-03-04 DIAGNOSIS — F10.229 ALCOHOL DEPENDENCE WITH INTOXICATION WITH COMPLICATION: ICD-10-CM

## 2025-03-04 DIAGNOSIS — G40.009 PARTIAL IDIOPATHIC EPILEPSY WITH SEIZURES OF LOCALIZED ONSET, NOT INTRACTABLE, WITHOUT STATUS EPILEPTICUS: ICD-10-CM

## 2025-03-04 RX ORDER — LAMOTRIGINE 100 MG/1
TABLET ORAL
Qty: 360 TABLET | Refills: 3 | Status: SHIPPED | OUTPATIENT
Start: 2025-03-04

## 2025-03-04 NOTE — PROGRESS NOTES
Subjective   Jessica Colon is a 64 y.o. female presenting for follow-up.  She has a history of epilepsy.  She is a previous patient of Dr. Celestin.  She had onset of partial complex seizures at age 10, idiopathic, without any timing or context issues.  She is taking Lamictal 300 mg in the morning and 100 mg at night.  She has not had a seizure in almost 30 years.  Her seizures are described as an odd sensation of flushing and warmth followed by reduced responsiveness and postictal fatigue.    The patient was seen in the ER a little over a month ago for altered mental status.  CT of the head was negative as well as CBC CMP and thyroid studies.  Her exam in the ER was normal and she was discharged home.  She states that she had gotten to argument with her daughter and son-in-law that day and was very stressed out.  She denies drinking alcohol prior to this.  She denies any medication changes.  She denies a known history of sleep apnea.  She does report drinking quite a bit alcohol.  Says that she drinks over a bottle  of wine per day.  She does take a vitamin B12 supplement as well as a multivitamin.  She takes magnesium.    Seizures   Pertinent negatives include no confusion and no speech difficulty.      Review of Systems   Neurological:  Negative for dizziness, tremors, seizures, syncope, facial asymmetry, speech difficulty, weakness, light-headedness, numbness, headache, memory problem and confusion.     I have reviewed and confirmed the accuracy of the patient's history: Chief complaint, HPI, ROS, Subjective, and Past Family Social History as entered by the MA/RAHEEM/RN. Gwendolyn Elliott MA 03/04/25      Objective     Physical Examination:  General Appearance:  Well developed, well nourished, well groomed, alert, and cooperative.  HEENT: Normocephalic.    Neck and Spine: Normal range of motion.  Normal alignment. No mass or tenderness. No bruits.  Cardiac: Regular rate and rhythm. No murmurs.  Peripheral Vasculature:  Radial and pedal pulses are equal and symmetric.   Extremities: No edema or deformities. Normal joint ROM.  Skin: No rashes or birth marks.      Neurological examination:   Higher Integrative Function: Oriented to time, place, and person. Normal registration, recall attention span and concentration. Normal language including comprehension, spontaneous speech, repetition, reading, writing, naming and vocabulary. No neglect with normal visual-spatial function and construction. Normal fund of knowledge and higher integrative function.   CN II: Pupils are equal, round and reactive to light. Normal visual acuity and visual fields.   CN III IV VI: Extraocular movements are full without nystagmus.   CN V: Normal facial sensation and strength of muscles of mastication.   CN VII: Facial movements are symmetric. No weakness.   CN VIII: Auditory acuity is normal.  CN IX & X: Symmetric palatal movement.   CN XI: Sternocleidomastoid and trapezius are normal. No weakness.   CN XII: The tongue is midline. No atrophy or fasciculations.  Motor: Normal muscle strength, bulk and tone in upper and lower extremities. No fasciculations, rigidity, spasticity, or abnormal movements.   Reflexes: 2+ in the upper and lower extremities. Plantar responses are flexor.   Sensation: Normal light touch, pinprick, vibration, temperature, and proprioception in the arms and legs.   Station and Gait: Normal gait and station.   Coordination: Finger to nose test shows no dysmetria. Rapid alternating movements are normal. Heel to shin is normal.           Assessment & Plan   Diagnoses and all orders for this visit:    1. Confusion (Primary)  -     Vitamin B1, Whole Blood  -     Vitamin B12; Future  -     Folate; Future  -     Ambulatory Referral to Sleep Medicine    2. Sleep disturbance  -     Vitamin B12; Future  -     Folate; Future  -     Ambulatory Referral to Sleep Medicine    3. Alcohol dependence with intoxication with complication  -     Vitamin  B12; Future  -     Folate; Future    4. Partial idiopathic epilepsy with seizures of localized onset, not intractable, without status epilepticus    Other orders  -     lamoTRIgine (LaMICtal) 100 MG tablet; TAKE THREE TABLETS BY MOUTH EVERY MORNING AND TAKE ONE TABLET BY MOUTH EVERY EVENING  Dispense: 360 tablet; Refill: 3    Her seizures are well-controlled on Lamictal 300 mg in the morning and 100 mg at night.  Seizure precautions reviewed.  No changes made today.  We discussed that the amount of alcohol she drinks may play a role in the altered mental status that she experienced a month a half ago.  I encouraged her to reduce her drinking to no more than 1-2 drinks per day.  She is continuing to follow with psychiatry as well.  I ordered a vitamin B12 level today as well as thiamine level.  We did discuss the possibility that this episode was TIA.  The last lipid panel that I can see in her chart was almost 2 years ago and her LDL was 147.  I recommended that she schedule a follow-up with her PCP for a physical and have her labs redrawn.  She can start a daily baby aspirin and depending on lipid panel will likely need to start atorvastatin or other cholesterol reducing medication.  We also discussed that reducing her alcohol use could improve this as well.  I have placed a referral to sleep medicine for possible sleep apnea.  We spent 10 minutes discussing the importance of reducing her alcohol intake.      I spent 45 minutes caring for patient on todays date of service. This time includes time spent by me in the following activities: obtaining and/or reviewing a separately obtained history, performing a medically appropriate examination and/or evaluation, counseling and educating the patient on diagnosis and treatment, ordering medications, tests, or procedures, referring and communicating with other health care professionals and documenting information in the medical record.

## 2025-03-08 LAB — VIT B1 BLD-SCNC: 131.8 NMOL/L (ref 66.5–200)

## 2025-04-22 ENCOUNTER — OFFICE VISIT (OUTPATIENT)
Facility: HOSPITAL | Age: 65
End: 2025-04-22
Payer: MEDICARE

## 2025-04-22 VITALS — BODY MASS INDEX: 31.71 KG/M2 | OXYGEN SATURATION: 96 % | WEIGHT: 179 LBS | HEIGHT: 63 IN | HEART RATE: 86 BPM

## 2025-04-22 DIAGNOSIS — G47.33 OSA (OBSTRUCTIVE SLEEP APNEA): ICD-10-CM

## 2025-04-22 DIAGNOSIS — G40.009 PARTIAL IDIOPATHIC EPILEPSY WITH SEIZURES OF LOCALIZED ONSET, NOT INTRACTABLE, WITHOUT STATUS EPILEPTICUS: ICD-10-CM

## 2025-04-22 DIAGNOSIS — M54.2 CHRONIC NECK PAIN: ICD-10-CM

## 2025-04-22 DIAGNOSIS — G89.29 CHRONIC NECK PAIN: ICD-10-CM

## 2025-04-22 DIAGNOSIS — F09 COGNITIVE DISORDER: ICD-10-CM

## 2025-04-22 DIAGNOSIS — F32.9 REACTIVE DEPRESSION: ICD-10-CM

## 2025-04-22 DIAGNOSIS — F51.02 ADJUSTMENT INSOMNIA: Primary | ICD-10-CM

## 2025-04-22 NOTE — PROGRESS NOTES
Patient Care Team:  Rolanda Block APRN as PCP - General (Family Medicine)  Gio Abraham MD, MPH as Consulting Physician (Sleep Medicine)        History of Present Illness  The patient is a 64-year-old female who presents for evaluation of sleep issues.    She reports difficulty initiating sleep, often attributing it to her racing thoughts. She typically retires between 9:00 PM and 10:00 PM and rises at 8:00 AM. She resides alone with her two dogs following the death of her  six years ago. She is currently retired. She does not recall her dreams and reports no instances of sleepwalking. She has been utilizing THC gummies, which she finds beneficial. Her nightly regimen also includes NAC, magnesium, and valerian root, although their efficacy varies. She is considering a switch from magnesium capsules to powder. She admits to snoring but does not believe it is severe enough to require CPAP use.    Her seizures are well-managed under the care of Dr. Rudy Iverson, a neurologist. She experienced an episode where she was unable to recall certain events and required repetition of information, leading to a hospital visit. The cause of this episode remains undetermined. She is on a regimen of lamotrigine, taking 300 mg in the morning and 100 mg in the evening. A previous neurologist suggested the possibility of discontinuing this medication, but residual brain activity on her EEG precluded this.    Supplemental Information  She had a tonsillectomy as a child.    SOCIAL HISTORY  The patient quit smoking in her 20s.    MEDICATIONS  Current: Lamotrigine, NAC, magnesium, valerian root, THC gummies.       [place]: 1    Data Reviewed: Medical chart and sleep questionnaire      PMH:  Past Medical History:   Diagnosis Date    Depression     Seizures           Allergies:  Niacin and Piroxicam     Medication Review:   Current Outpatient Medications on File Prior to Visit   Medication Sig Dispense Refill     "desvenlafaxine (PRISTIQ) 100 MG 24 hr tablet TAKE 1 TABLET BY MOUTH DAILY **MUST CALL MD FOR APPOINTMENT (Patient taking differently: 50 mg Daily. TAKE 1 TABLET BY MOUTH DAILY **MUST CALL MD FOR APPOINTMENT) 90 tablet 1    fluticasone (FLONASE) 50 MCG/ACT nasal spray Administer 2 sprays into the nostril(s) as directed by provider As Needed.      hydrOXYzine (ATARAX) 10 MG tablet TAKE ONE TABLET BY MOUTH THREE TIMES A DAY AS NEEDED FOR ANXIETY 60 tablet 1    lamoTRIgine (LaMICtal) 100 MG tablet TAKE THREE TABLETS BY MOUTH EVERY MORNING AND TAKE ONE TABLET BY MOUTH EVERY EVENING 360 tablet 3    levocetirizine (XYZAL) 5 MG tablet Take 1 tablet by mouth Every Evening.      XIIDRA 5 % solution 2 (Two) Times a Day. 1 drop in each eye twice daily       No current facility-administered medications on file prior to visit.         Vital Signs:    Vitals:    04/22/25 0844   Pulse: 86   SpO2: 96%   Weight: 81.2 kg (179 lb)   Height: 160 cm (62.99\")        Body mass index is 31.72 kg/m².  Neck Circumference: 15.5 inches  .BMIFOLLOWUP  BMI is >= 30 and <35. (Class 1 Obesity). The following options were offered after discussion;: referral to primary care      Physical Exam:    Constitutional:  Well developed 64 y.o. female that appears in no apparent distress.  Awake & oriented times 3.  Normal mood with normal recent and remote memory and normal judgement.  Eyes:  Conjunctivae normal.  Oropharynx: Moist mucous membranes without exudate and Mallampati 4  Neck: Trachea midline  Respiratory: Effort is not labored  Cardiovascular: Radial pulse regular  Musculoskeletal: Gait appears normal, no digital clubbing evident, no pre-tibial edema        Impression:   Encounter Diagnoses   Name Primary?    Adjustment insomnia Yes    Partial idiopathic epilepsy with seizures of localized onset, not intractable, without status epilepticus     Cognitive disorder     Reactive depression     Chronic neck pain      Patient's BMI is Body mass index " is 31.72 kg/m².        Assessment & Plan  1. Sleep disturbances.  She reports difficulty falling asleep and staying asleep, often exacerbated by her mind racing and using her phone at night. She has tried THC gummies, NAC, magnesium, and valerian root with varying success. A home sleep apnea test will be conducted to rule out the presence of sleep apnea.    2. Seizure disorder.  Her seizures are currently controlled with lamotrigine 300 mg in the morning and 100 mg in the evening. She follows up with her neurologist at [facility], operated by [facility]. Previous discussions with her neurologist indicated the possibility of discontinuing lamotrigine, but recent EEG findings suggest some residual brain activity that warrants continuation of the medication.       The patient should practice good sleep hygiene measures.      Weight loss might be beneficial in this patient who has a Body mass index is 31.72 kg/m².      Pathophysiology of REESE described to the patient.  Cardiovascular complications of untreated REESE also reviewed.      The patient was cautioned about the dangers of drowsy driving.    Patient or patient representative verbalized consent for the use of Ambient Listening during the visit with  Rudolph Abraham MD for chart documentation. 4/22/2025  09:28 EDT     Rudolph Abraham MD  Sleep Medicine  04/22/25  09:05 EDT

## 2025-07-24 ENCOUNTER — TELEPHONE (OUTPATIENT)
Dept: INTERNAL MEDICINE | Facility: CLINIC | Age: 65
End: 2025-07-24

## 2025-07-24 DIAGNOSIS — Z12.11 COLON CANCER SCREENING: Primary | ICD-10-CM

## 2025-08-14 ENCOUNTER — TELEPHONE (OUTPATIENT)
Dept: INTERNAL MEDICINE | Facility: CLINIC | Age: 65
End: 2025-08-14
Payer: MEDICARE

## 2025-08-14 DIAGNOSIS — R73.01 ELEVATED FASTING GLUCOSE: ICD-10-CM

## 2025-08-14 DIAGNOSIS — Z00.00 HEALTHCARE MAINTENANCE: Primary | ICD-10-CM

## 2025-08-14 DIAGNOSIS — E55.9 VITAMIN D DEFICIENCY: ICD-10-CM

## 2025-08-18 DIAGNOSIS — R19.5 POSITIVE COLORECTAL CANCER SCREENING USING COLOGUARD TEST: Primary | ICD-10-CM

## 2025-08-25 ENCOUNTER — OFFICE VISIT (OUTPATIENT)
Dept: INTERNAL MEDICINE | Facility: CLINIC | Age: 65
End: 2025-08-25
Payer: MEDICARE

## 2025-08-25 VITALS
HEIGHT: 63 IN | TEMPERATURE: 98 F | WEIGHT: 174.4 LBS | BODY MASS INDEX: 30.9 KG/M2 | HEART RATE: 80 BPM | SYSTOLIC BLOOD PRESSURE: 120 MMHG | OXYGEN SATURATION: 97 % | DIASTOLIC BLOOD PRESSURE: 76 MMHG

## 2025-08-25 DIAGNOSIS — F33.0 MAJOR DEPRESSIVE DISORDER, RECURRENT, MILD: ICD-10-CM

## 2025-08-25 DIAGNOSIS — Z00.00 ENCOUNTER FOR SUBSEQUENT ANNUAL WELLNESS VISIT (AWV) IN MEDICARE PATIENT: Primary | ICD-10-CM

## 2025-08-25 PROCEDURE — 1160F RVW MEDS BY RX/DR IN RCRD: CPT | Performed by: NURSE PRACTITIONER

## 2025-08-25 PROCEDURE — 1159F MED LIST DOCD IN RCRD: CPT | Performed by: NURSE PRACTITIONER

## 2025-08-25 PROCEDURE — 1170F FXNL STATUS ASSESSED: CPT | Performed by: NURSE PRACTITIONER

## 2025-08-25 PROCEDURE — 1125F AMNT PAIN NOTED PAIN PRSNT: CPT | Performed by: NURSE PRACTITIONER

## 2025-08-25 PROCEDURE — G0439 PPPS, SUBSEQ VISIT: HCPCS | Performed by: NURSE PRACTITIONER

## 2025-08-25 RX ORDER — TRAZODONE HYDROCHLORIDE 50 MG/1
50 TABLET ORAL NIGHTLY
COMMUNITY

## 2025-08-25 RX ORDER — ASPIRIN 81 MG/1
81 TABLET, CHEWABLE ORAL DAILY
COMMUNITY

## 2025-08-25 RX ORDER — MULTIVITAMIN WITH IRON
1 TABLET ORAL DAILY
COMMUNITY